# Patient Record
Sex: MALE | Race: WHITE | Employment: FULL TIME | URBAN - METROPOLITAN AREA
[De-identification: names, ages, dates, MRNs, and addresses within clinical notes are randomized per-mention and may not be internally consistent; named-entity substitution may affect disease eponyms.]

---

## 2022-04-11 ENCOUNTER — TELEPHONE (OUTPATIENT)
Dept: ORTHOPEDIC SURGERY | Age: 52
End: 2022-04-11

## 2022-04-11 ENCOUNTER — OFFICE VISIT (OUTPATIENT)
Dept: ORTHOPEDIC SURGERY | Age: 52
End: 2022-04-11
Payer: COMMERCIAL

## 2022-04-11 DIAGNOSIS — M25.562 LEFT KNEE PAIN, UNSPECIFIED CHRONICITY: Primary | ICD-10-CM

## 2022-04-11 PROCEDURE — 99203 OFFICE O/P NEW LOW 30 MIN: CPT | Performed by: PHYSICIAN ASSISTANT

## 2022-04-11 RX ORDER — CIPROFLOXACIN 500 MG/1
500 TABLET, FILM COATED ORAL EVERY 12 HOURS
COMMUNITY

## 2022-04-11 RX ORDER — DILTIAZEM HYDROCHLORIDE 120 MG/1
1 CAPSULE, EXTENDED RELEASE ORAL
COMMUNITY
Start: 2021-10-15

## 2022-04-11 RX ORDER — LISINOPRIL 10 MG/1
TABLET ORAL
COMMUNITY
Start: 2021-05-13

## 2022-04-11 RX ORDER — ATORVASTATIN CALCIUM 20 MG/1
TABLET, FILM COATED ORAL
COMMUNITY
Start: 2021-12-22

## 2022-04-11 NOTE — LETTER
[address] [facility]  [address] [facility] [address]  Phone: [phone]  Fax: [place], [place]        April 11, 2022     Patient: Moni Bal   YOB: 1970   Date of Visit: 4/11/2022       To Whom it May Concern:    Silver Llamas was seen in my clinic on 4/11/2022. 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 .    If you have any questions or concerns, please don't hesitate to call.     Sincerely,         CHRISTINA Xiao

## 2022-04-11 NOTE — LETTER
[address] [facility]  [address] [facility] [address]  Phone: [phone]  Fax: [place], [place]        April 11, 2022     Patient: Snow White   YOB: 1970   Date of Visit: 4/11/2022       To Whom it May Concern:    Ashish Merino was seen in my clinic on 4/11/2022. He may return to work on +4/13/22 with the following symptoms: LIMITED WALKING, STANDING AND LIMITED CLIMBING INTO TRUCK. .    If you have any questions or concerns, please don't hesitate to call.     Sincerely,         CHRISTINA Rosen

## 2022-04-11 NOTE — TELEPHONE ENCOUNTER
Called and spoke with patient. Let him know that he needs to get his MRI first. Patient asking if he can work. Explained that he will need to call his treating physician since we have not evaluated him. Patient stated that he will go to the [facility] to be seen.

## 2022-04-11 NOTE — PROGRESS NOTES
Subjective:      Patient ID: Marylen Floor is a 46 y.o. male who is here in the [address] [place], [place] [address]y [address]- for an initial evaluation of left medial and posterior knee pain. HPI:   He states back in early January he slipped on the ice and sustained a hyperflexion injury to the left knee. He developed moderate knee pain at that time. Pain has gradually improved over the past couple weeks but still having mild to moderate discomfort. He is concerned because he feels there is extra fluid on the left knee. 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 He has tried an elastic knee sleeve with mild improvement. He has also tried ice. He cannot take NSAIDs due to anticoagulation therapy. Approximately 2 weeks ago he was seen by Dr. Jluis Woodall with [facility]. He states he was not given much information at that appointment. However, he is scheduled for a MRI on his left knee 4/21/2022. Pain Scale initially was 4-5/10 VAS. Currently today 2/10 VAS. He was off work today. Location of pain medial and posterior left knee. Pain is worse with activities such as standing, walking, steps. Pain improves with ice. Review of Systems:  I have reviewed the clinically relevant past medical history, medications, allergies, family history, social history, and 13 point Review of Systems from the patient's recent history form & documented any details relevant to today's presenting complaints in the history above. The patient's self-reported past medical history, medications, allergies, family history, social history, and Review of Systems form from today's date have been scanned into the chart under the \"Media\" tab. As outlined in the HPI. Negative for fever or chills. Negative for poly-joint pain, swelling and stiffness. Negative for numbness or tingling into the affected extremity.      No past medical history on file. No family history on file. No past surgical history on file. Social History     Occupational History    Not on file   Tobacco Use    Smoking status: Never Smoker    Smokeless tobacco: Never Used   Substance and Sexual Activity    Alcohol use: Not on file    Drug use: Not on file    Sexual activity: Not on file       Current Outpatient Medications   Medication Sig Dispense Refill    apixaban (ELIQUIS) 5 MG TABS tablet Take by mouth      atorvastatin (LIPITOR) 20 MG tablet Take by mouth      lisinopril (PRINIVIL;ZESTRIL) 10 MG tablet Take by mouth      metFORMIN (GLUCOPHAGE) 500 MG tablet Take by mouth      dilTIAZem (TIAZAC) 120 MG extended release capsule Take 1 capsule by mouth      ciprofloxacin (CIPRO) 500 MG tablet Take 500 mg by mouth every 12 hours       No current facility-administered medications for this visit. No Known Allergies      Objective:     He is alert, oriented x 3, pleasant, well nourished, developed and in no acute distress. There were no vitals taken for this visit. Examination of the left knee: Inspection of the skin minimal swelling, no erythema. The overall alignment of the knee is neutral.  Gait is mildly antalgic. There is minimal intra-articular effusion. AROM:           PROM:  Extension-         0                   0  Flexion -           125                   130  There mild pain associated with ROM testing. Medial joint line is tender to palpation. Lateral joint line is not tender to palpation. Pes anserine bursa non-tender to palpation. Patellar tendon non-tender to palpation. Quadriceps tendon non- tender to palpation. Collateral ligaments non- tender to palpation. Popliteal fossa is tender to palpation. Varus Stress testing negative for laxity, pain or crepitus. Valgus Stress testing negative for laxity, pain or crepitus. Lachman's test negative for  ACL laxity.   Anterior Drawer test negative for ACL laxity. Posterior Drawer test negative for PCL laxity. Jessi's Test reproduces pain posteriorly. Patellar Compression testing negative for pain or crepitus. Extensor Mechanism is intact. Lower extremities:  He has 5/5 motor strength of bilateral lower extremities. He has a negative straight leg raise, bilaterally. Deep tendon reflexes at knees and achilles are 2+. Sensation is intact to light touch L3 to S1 bilaterally. He has no clonus. Examination of the lower extremities shows intact perfusion to both lower extremities. He has no cyanosis and digigts are warm to touch, capillary refill is less than 2 seconds. He has no edema noted. He has intact skin without lacerations or abrasions, no significant erythema, rashes or skin lesions. X Rays: were performed in the office today:   AP, PA Standing, Lateral and Sunrise views of left knee:   No acute fractures or dislocations noted. Knee x-rays negative for significant osteoarthritis. Additional Tests reviewed: none  Additional Outside Records reviewed: none    Diagnosis:       ICD-10-CM    1. Left knee pain, unspecified chronicity  M25.562 XR KNEE LEFT (MIN 4 VIEWS)        Assessment and Plan:       Assessment:  Hyperflexion injury to left knee which occurred several months ago. Mild joint swelling with a positive Jessi's test concerning for probable meniscus tear. He does have a MRI scheduled on his left knee 4/21/2022 at [facility]. This had been ordered by Dr. Emmy Clifton after being evaluated by him 2 weeks ago. Patient not satisfied with his care therefore he presents to the [address] [place] [address] after Hours clinic for reevaluation. I had an extensive discussion with  Javon Zane regarding the natural history, etiology, and long term consequences of his condition. I have presented reasonable alternatives to the patient's proposed care, treatment, and services.   Risks and benefits of the treatment options also reviewed in detail. I have outlined a treatment plan with them. He has had full opportunity to ask his questions. I have answered them all to his satisfaction. I feel that Mr. Elton Pinon understands our discussion today       Plan:  Medications-Tylenol recommended for pain. No NSAIDs due to anticoagulation therapy. PT- A home exercise program was instructed today including ROM exercises and strengthening exercises. The patient verbalized understanding of these exercises as well as the importance of the exercise program to promote return of normal function. If pain intensifies or other problems arise you are to notify the office. Further Imaging-proceed with MRI left knee as previously scheduled. Follow up-Dr. Wing Henning after his MRI appointment. Call or return to clinic if these symptoms worsen or fail to improve as anticipated. Ambreen Herrera PA-C   Senior Physician Assistant   [facility]                                         Disclaimer: This note was generated with use of a verbal recognition program (DRAGON) and an attempt was made to check for errors. It is possible that there are still dictated errors within this office note. If so, please bring any significant errors to my attention for an addendum. All efforts were made to ensure that this office note is accurate.

## 2022-04-25 ENCOUNTER — OFFICE VISIT (OUTPATIENT)
Dept: ORTHOPEDIC SURGERY | Age: 52
End: 2022-04-25
Payer: COMMERCIAL

## 2022-04-25 VITALS — HEIGHT: 71 IN | BODY MASS INDEX: 40.6 KG/M2 | WEIGHT: 290 LBS

## 2022-04-25 DIAGNOSIS — S83.232A COMPLEX TEAR OF MEDIAL MENISCUS OF LEFT KNEE AS CURRENT INJURY, INITIAL ENCOUNTER: ICD-10-CM

## 2022-04-25 DIAGNOSIS — M25.562 ACUTE PAIN OF LEFT KNEE: ICD-10-CM

## 2022-04-25 DIAGNOSIS — M17.12 LOCALIZED OSTEOARTHRITIS OF LEFT KNEE: ICD-10-CM

## 2022-04-25 DIAGNOSIS — M84.453A INSUFFICIENCY FRACTURE OF MEDIAL FEMORAL CONDYLE (HCC): Primary | ICD-10-CM

## 2022-04-25 PROCEDURE — 99204 OFFICE O/P NEW MOD 45 MIN: CPT | Performed by: ORTHOPAEDIC SURGERY

## 2022-04-25 NOTE — PROGRESS NOTES
ORTHOPAEDIC SURGERY H&P / CONSULTATION NOTE    Chief complaint:   Chief Complaint   Patient presents with    Knee Pain     left knee pain        History of present illness: The patient is a 46 y.o. male with subjective symptoms of left knee pain. The chief complaint is located at medial aspect of the left knee. Duration of symptoms has been for 3 months. The severity of symptoms is rated at 3/10 pain on intake form. Patient states that he had slipped and fallen and ultimately had his left knee \"fold back on him\". He had seen Dr. Clarke Chris initially and had obtained radiographs and MRI. For reasons that he states he wished to obtain a second opinion, he follows up today with MRI and wishing to discuss potential treatment options. He states dull throbbing aching pain in the medial aspect of the left knee but this can also be with sharp pain along the anteromedial aspect of the knee. He has been taking Tylenol as he is unable to take oral anti-inflammatories given he is on blood thinners for atrial fibrillation. He has not done any formal physical therapy and has not had any activity modifications of significance. He states he walks around with a limp and uses a neoprene knee sleeve. He is wearing this today. He denies significant swelling in the knee. He states he does have some occasional mechanical twisting knee pain    The patient has tried the below listed items prior to today's consultation for above listed chief complaint.     -   Over-the-counter anti-inflammatories/prescription medication anti-inflammatory. -   Physical therapy / guided home exercise program -     -   Previous corticosteroid injections    Past medical history:  No past medical history on file. Past surgical history:  No past surgical history on file.      Allergies:  No Known Allergies      Medications:   Current Outpatient Medications:     apixaban (ELIQUIS) 5 MG TABS tablet, Take by mouth, Disp: , Rfl:     atorvastatin (LIPITOR) 20 MG tablet, Take by mouth, Disp: , Rfl:     lisinopril (PRINIVIL;ZESTRIL) 10 MG tablet, Take by mouth, Disp: , Rfl:     metFORMIN (GLUCOPHAGE) 500 MG tablet, Take by mouth, Disp: , Rfl:     dilTIAZem (TIAZAC) 120 MG extended release capsule, Take 1 capsule by mouth, Disp: , Rfl:     ciprofloxacin (CIPRO) 500 MG tablet, Take 500 mg by mouth every 12 hours, Disp: , Rfl:      Social history: Denies IV drug use. Social History     Socioeconomic History    Marital status: Single     Spouse name: Not on file    Number of children: Not on file    Years of education: Not on file    Highest education level: Not on file   Occupational History    Not on file   Tobacco Use    Smoking status: Never Smoker    Smokeless tobacco: Never Used   Substance and Sexual Activity    Alcohol use: Not on file    Drug use: Not on file    Sexual activity: Not on file   Other Topics Concern    Not on file   Social History Narrative    Not on file     Social Determinants of Health     Financial Resource Strain:     Difficulty of Paying Living Expenses: Not on file   Food Insecurity:     Worried About Running Out of Food in the Last Year: Not on file    Rigo of Food in the Last Year: Not on file   Transportation Needs:     Lack of Transportation (Medical): Not on file    Lack of Transportation (Non-Medical):  Not on file   Physical Activity:     Days of Exercise per Week: Not on file    Minutes of Exercise per Session: Not on file   Stress:     Feeling of Stress : Not on file   Social Connections:     Frequency of Communication with Friends and Family: Not on file    Frequency of Social Gatherings with Friends and Family: Not on file    Attends Scientology Services: Not on file    Active Member of Clubs or Organizations: Not on file    Attends Club or Organization Meetings: Not on file    Marital Status: Not on file   Intimate Partner Violence:     Fear of Current or Ex-Partner: Not on file   [address] [place] [address] Emotionally Abused: Not on file    Physically Abused: Not on file    Sexually Abused: Not on file   Housing Stability:     Unable to Pay for Housing in the Last Year: Not on file    Number of Places Lived in the Last Year: Not on file    Unstable Housing in the Last Year: Not on file     Tobacco use. Social History     Tobacco Use   Smoking Status Never Smoker   Smokeless Tobacco Never Used     Employment: Noncontributory    Workers compensation claim: Noncontributory    Review of systems: Patient denies any fevers chills chest pain shortness of breath nausea vomiting significant weight loss any change in voiding or bowel movements. Patient denies any significant numbness or tingling at baseline as it relates to this presenting symptom/chief complaint. The patient denies any significant problems with skin or any significant allergies. Physical examination:  Body mass index is 40.45 kg/m². AAOx3, NCAT  EOMI  MMM  RR  Unlabored breathing, no wheezing  Skin intact BUE and BLE, warm and moist  Bilateral lower extremity examination specific to subjective symptoms  Exam Left Lower Extremity  Trace effusion,      0/118/0 active ROM (E/F/Lag), same P assive ROM (E/F/Lag), negative anterior Drawer, 1A Lachman, negative posterior Drawer,  Stable varus/valgus at 0 and 30?,    none TTP Joint Line, trace posterior medial Jessi,   positive pain upon tenderness to palpation medial femoral condyle with the knee at 90 degrees. Skin intact throughout  5/5 IP Q H TA G EHL  SILT DP SP LP MP S S  +2 DP pulse    Diagnostic imaging:  MY READ:  4 view left knee 4/11/2022: Negative fracture. Positive medial and patellofemoral joint space narrowing greater than lateral  MRI left knee 4/21/2022: See outside hospital report  MY READ: ACL PCL LCL MCL intact. No gross lateral meniscus tear.   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 This is in the setting of baseline chondromalacia however this edema is more so appreciated either by way of the osteochondral subcortical insufficiency fracture versus a direct impact.  -To that end, I would like to provide 6 weeks or so of protected weightbearing status. He was given crutches today and may 25% partial weightbearing. He can work on full knee range of motion and I instructed him not to place any pillows behind the knee. -Formal physical therapy has been prescribed to work on LEIF knee reconditioning and strengthening to include core and hip reconditioning.  -He is unable to take oral anti-inflammatories so we will recommend Voltaren topical gel per bottle as needed discomfort and OTC Tylenol per bottle parent discomfort  -Given the insufficiency fracture/injury, I have also ordered a vitamin D panel for review. Should he be insufficient/deficient then adequate therapy will be started and he will be referred to endocrinology at that time for longer-term duration and treatment and work-up. -Plan will be to have him follow-up in 6 to 8 weeks time where progressive increase her weightbearing status and weaning off crutches will be performed. I reviewed with him that should he have significant mechanical twisting knee pain in the future and persistent, always consideration at that time for a partial meniscectomy however that does not seem his primary pain generator at this time. He expressed understanding.  -All questions answered to the patient's satisfaction and the patient expressed understanding and agreement with the above listed treatment plan  -Follow up in 6 to 8 weeks time with AP and Lawayne Chi view only as 2 view left knee and for repeat clinical examination and progression with range of motion  -Thank you for the clinical consultation and allowing me to participate in the patient's care.       Electronically signed by Crissy Ortega MD on 4/25/22 at 12:09 PM EDT Dylan Love MD       Orthopaedic Surgery-Sports Medicine        Disclaimer: This note was dictated with voice recognition software. Though review and correction are routinely performed, please contact the office/medical records for any errors requiring correction.

## 2022-04-25 NOTE — LETTER
[address] [place] [address]  Phone: [phone]  Fax: [phone]    Sudhakar Cordova MD        April 25, 2022     Patient: Lubna Tavera   YOB: 1970   Date of Visit: 4/25/2022       To Whom It May Concern: It is my medical opinion that Hardik Bentley may return to light duty immediately with the following restrictions: on crutches for the next 6 weeks. If you have any questions or concerns, please don't hesitate to call.     Sincerely,           Sudhakar Cordova MD       Orthopaedic Surgery-Sports Medicine      Sudhakar Cordova MD

## 2022-05-06 ENCOUNTER — TELEPHONE (OUTPATIENT)
Dept: ORTHOPEDIC SURGERY | Age: 52
End: 2022-05-06

## 2022-05-06 NOTE — TELEPHONE ENCOUNTER
General Question     Subject: PT CALLED CONCERNING HIS SHORT TERM DISABILITY PAPERWORK.   Amador Calero DISABILITY REQUESTED CONFIRMATION REPORT OF KNEE.  [facility]. [phone]  CLAIM  [id number]  Patient and /or Facility Request: Jesus Aleman Number: [phone]

## 2022-05-09 ENCOUNTER — TELEPHONE (OUTPATIENT)
Dept: ORTHOPEDIC SURGERY | Age: 52
End: 2022-05-09

## 2022-05-09 NOTE — TELEPHONE ENCOUNTER
Called patient back and told him to find out what is needed from his employer for the paper work and then he can fax it over to me.

## 2022-05-17 ENCOUNTER — TELEPHONE (OUTPATIENT)
Dept: ORTHOPEDIC SURGERY | Age: 52
End: 2022-05-17

## 2022-05-17 NOTE — TELEPHONE ENCOUNTER
Called patient back and let him know that I will fax over clinical notes and email them to him as well.
General Question     Subject: SHORT TERM DISABILITY PAPERWORK  Patient and /or Facility Request: Warren Marquez  Contact Number: [phone]    PATIENT IS CALLING REGARDING SHORT TERM DISABILITY PAPERWORK. PATIENT IS STATING OFFICE NOTES ARE NEEDING TO BE SENT OVER TO [facility]. PATIENT IS ALSO REQ A COPY OF OFFICE NOTES AS WELL. PLEASE CALL BACK AT ABOVE NUMBER.
0.2

## 2022-05-23 ENCOUNTER — OFFICE VISIT (OUTPATIENT)
Dept: ORTHOPEDIC SURGERY | Age: 52
End: 2022-05-23

## 2022-05-23 DIAGNOSIS — G89.29 CHRONIC PAIN OF LEFT KNEE: ICD-10-CM

## 2022-05-23 DIAGNOSIS — M84.453A INSUFFICIENCY FRACTURE OF MEDIAL FEMORAL CONDYLE (HCC): Primary | ICD-10-CM

## 2022-05-23 DIAGNOSIS — M25.562 CHRONIC PAIN OF LEFT KNEE: ICD-10-CM

## 2022-05-23 PROCEDURE — 99213 OFFICE O/P EST LOW 20 MIN: CPT | Performed by: ORTHOPAEDIC SURGERY

## 2022-05-23 NOTE — PROGRESS NOTES
FOLLOW UP ORTHOPAEDIC NOTE    The patient follows up today for reevaluation of left knee pain. The patient states he has not been doing formal physical therapy as was previously prescribed. He did not obtain the vitamin D blood work as previously ordered. He has transition to using just a single crutch with weightbearing. He has been utilizing Voltaren topical gel OTC as needed. He had contacted the office regarding short-term disability paperwork as documented in the chart. He states still having some discomfort and pain with walking, although he does have improved symptoms compared to where he was. 2/10 pain on intake form. PE:  AAOx3  RR  Unlabored breathing  Skin warm and moist  Focused physical examination of the left knee  Minimal if any tenderness to the knee medial joint line with the knee at 90 degrees flexion. Skin intact throughout  5/5 IP Q H TA G EHL  SILT DP SP LP MP S S  +2 DP pulse    Pertinent radiographs/imaging:  3 view left knee 5/23/2022: Negative fracture. No gross interval change with regard to the medial joint line-femoral condyle/medial tibial plateau     Diagnosis Orders   1. Insufficiency fracture of medial femoral condyle (HCC)  XR KNEE LEFT (1-2 VIEWS)   2. Chronic pain of left knee       Assessment and plan: 46 male with continued subjective symptoms of left knee pain with known, correlating diagnosis of left knee insufficiency fracture of the medial femoral condyle, also with finding of medial meniscus tear.  -Time of 16 minutes was spent coordinating and discussing the clinical findings and diagnostic imaging results as they pertain to the patient's presenting subjective symptoms.  -I had a pleasant discussion with the patient today. I did review with him that I was surprised that he did not follow through with our previous treatment plan as discussed.   I advocated that he once again go get the vitamin D level so that we can see if he is vitamin D insufficient or deficient that might require additional medical comanagement and replacement therapy. He states that he will do so  -I also reviewed with him the importance of protected weightbearing which was 25% and to crutch use for roughly 4 to 6 weeks. He was also supposed to follow-up thereafter after having completed that. We will go ahead and resume 25% partial weightbearing using 2 crutches at this time. He states he will do so.  -He also did not start his formal physical therapy. I advocated he do this as a referral is still in place. He states that he will do so. -I do feel strongly that we will be able to continue to improve his overall baseline function and range of motion and symptomatology. He denies mechanical twisting knee pain at this time so I still feel that this is more so like an overload related phenomenon, especially with his BMI over 40.  -I will follow up with him once he obtains blood work with additional treatment options as indicated at that time.  -All questions answered to the patient's satisfaction and the patient expressed understanding and agreement with the above listed treatment plan  -Follow up in 6 weeks time for repeat evaluation clinical examination and AP and PA weightbearing bent view of the left knee. -Thank you for the clinical consultation and allowing me to participate in the patient's care. Electronically signed by Chuck Jefferson MD on 5/23/22 at 12:21 PM PAPO Jefferson MD       Orthopaedic Surgery-Sports Medicine    Disclaimer: This note was dictated with voice recognition software. Though review and correction are routinely performed, please contact the office/medical records for any errors requiring correction.

## 2022-05-24 ENCOUNTER — HOSPITAL ENCOUNTER (OUTPATIENT)
Dept: PHYSICAL THERAPY | Age: 52
Setting detail: THERAPIES SERIES
Discharge: HOME | End: 2022-05-24
Payer: COMMERCIAL

## 2022-05-24 NOTE — FLOWSHEET NOTE
[place] [address] [facility],  [address]        Physical Therapy  Cancellation/No-show Note  Patient Name:  Jannette Schroeder  :  1970   Date:  2022  Cancelled visits to date: 1 initial eval  No-shows to date: 0    For today's appointment patient:  [x]  Cancelled  []  Rescheduled appointment  []  No-show     Reason given by patient:  []  Patient ill  []  Conflicting appointment  []  No transportation    []  Conflict with work  []  No reason given  [x]  Other: R/S for next day eval   Comments:      Electronically signed by:  Spencer Day, PT, DPT

## 2022-05-25 ENCOUNTER — HOSPITAL ENCOUNTER (OUTPATIENT)
Dept: PHYSICAL THERAPY | Age: 52
Setting detail: THERAPIES SERIES
Discharge: HOME | End: 2022-05-25
Payer: COMMERCIAL

## 2022-05-25 ENCOUNTER — TELEPHONE (OUTPATIENT)
Dept: ORTHOPEDIC SURGERY | Age: 52
End: 2022-05-25

## 2022-05-25 PROCEDURE — 97110 THERAPEUTIC EXERCISES: CPT | Performed by: PHYSICAL THERAPIST

## 2022-05-25 PROCEDURE — 97161 PT EVAL LOW COMPLEX 20 MIN: CPT | Performed by: PHYSICAL THERAPIST

## 2022-05-25 NOTE — PLAN OF CARE
[facility], [address], [address] [place] [address]  Phone: [phone]  Fax [phone]     Hilda Boone    Dear  Dr. Tammy Foster ,    We had the pleasure of evaluating the following patient for physical therapy services at [address] [place] [address]. A summary of our findings can be found in the initial assessment below. This includes our plan of care. If you have any questions or concerns regarding these findings, please do not hesitate to contact me at the office phone number checked above.   Thank you for the referral.       Physician Signature:_______________________________Date:__________________  By signing above (or electronic signature), therapists plan is approved by physician    Patient: Kat Soto   : 1970   MRN: [id number]  Referring Physician:  Dr. Dupont Billie       Evaluation Date: 2022      Medical Diagnosis Information:  Diagnosis: M17.12 (ICD-10-CM) - Localized osteoarthritis of left [id number] (ICD-10-CM) - Complex tear of medial meniscus of left knee as current injury, initial geexcrcgfU64.453A (ICD-10-CM) - Insufficiency fracture of medial femoral condyle (HCC)   Treatment Diagnosis: R knee pain M25.561                                         Insurance information: PT Insurance Information: [email or web address]       Precautions/ Contra-indications: suppose to be 25% WBing, A-fib, DM II, mild htn      C-SSRS Triggered by Intake questionnaire (Past 2 wk assessment):   [x] No, Questionnaire did not trigger screening.   [] Yes, Patient intake triggered further evaluation      [] C-SSRS Screening completed  [] PCP notified via Plan of Care  [] Emergency services notified     Latex Allergy:  [x]NO      []YES  Preferred Language for Healthcare:   [x]English       []other:    SUBJECTIVE: Patient reports in 2022, he was walking out to his jeep in a steep driveway and slipped on ice; and his L leg folded underneath him and he fell on his back. He had pain for 3 months and was wearing a compression sleeve. Pt reports he finally went to see MD but had not prev because he was so busy at work. He had xray and MRI which showed torn mensicus and insufficency fx. He was told to attend PT and 25% Wbing. He is on short term disability and there is no light duty.  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 [address] [place]; pt was working as , was able to ride motorcycle     OBJECTIVE:     ROM LEFT RIGHT   HIP Flex     HIP Abd     HIP Ext     HIP IR     HIP ER     Knee ext -5 -3   Knee Flex 121 130   Ankle PF     Ankle DF     Ankle In     Ankle Ev     Strength  LEFT RIGHT   HIP Flexors 5 5   HIP Abductors 4- 4   HIP Ext     Hip ER     Knee EXT (quad) 4 4+   Knee Flex (HS) 5 5   Ankle DF 5 5   Ankle PF     Ankle Inv     Ankle EV          Circumference  Mid apex  7 cm prox             Reflexes/Sensation: NT    []Dermatomes/Myotomes intact    [x]Reflexes equal and normal bilaterally   []Other:    Joint mobility:    [x]Normal    []Hypo   []Hyper    Palpation: mild tenderness with palp to R med meniscus     Functional Mobility/Transfers: independent     Posture: fwd flexed     Bandages/Dressings/Incisions: NA     Gait: 25% wbing with 2 crutches, max VC's for sequencing nd WBing, decreased speed, difficulty understanding 25% WBing     Orthopedic Special Tests: NT                        [x] Patient history, allergies, meds reviewed. Medical chart reviewed. See intake form. Review Of Systems (ROS):  [x]Performed Review of systems (Integumentary, CardioPulmonary, Neurological) by intake and observation. Intake form has been scanned into medical record. Patient has been instructed to contact their primary care physician regarding ROS issues if not already being addressed at this time. Co-morbidities/Complexities (which will affect course of rehabilitation):   []None           Arthritic conditions   []Rheumatoid arthritis (M05.9)  []Osteoarthritis (M19.91)   Cardiovascular conditions   []Hypertension (I10)  []Hyperlipidemia (E78.5)  []Angina pectoris (I20)  []Atherosclerosis (I70)   Musculoskeletal conditions   []Disc pathology   []Congenital spine pathologies   []Prior surgical intervention  []Osteoporosis (M81.8)  []Osteopenia (M85.8)   Endocrine conditions   []Hypothyroid (E03.9)  []Hyperthyroid Gastrointestinal conditions   []Constipation (G63.56)   Metabolic conditions   [x]Morbid obesity (E66.01)  [x]Diabetes type 1(E10.65) or 2 (E11.65)   []Neuropathy (G60.9)     Pulmonary conditions   []Asthma (J45)  []Coughing   []COPD (J44.9)   Psychological Disorders  []Anxiety (F41.9)  []Depression (F32.9)   []Other:   [x]Other: A-fib         Barriers to/and or personal factors that will affect rehab potential:              []Age  []Sex              []Motivation/Lack of Motivation                        []Co-Morbidities              []Cognitive Function, education/learning barriers              []Environmental, home barriers              []profession/work barriers  []past PT/medical experience  []other:  Justification:     Falls Risk Assessment (30 days):   [x] Falls Risk assessed and no intervention required.   [] Falls Risk assessed and Patient requires intervention due to being higher risk   TUG score (>12s at risk): [] Falls education provided, including           ASSESSMENT:   Functional Impairments:     []Noted lumbar/proximal hip/LE joint hypomobility   [x]Decreased LE functional ROM   [x]Decreased core/proximal hip strength and neuromuscular control   [x]Decreased LE functional strength   [x]Reduced balance/proprioceptive control   []other:      Functional Activity Limitations (from functional questionnaire and intake)   []Reduced ability to tolerate prolonged functional positions   [x]Reduced ability or difficulty with changes of positions or transfers between positions   [x]Reduced ability to maintain good posture and demonstrate good body mechanics with sitting, bending, and lifting   []Reduced ability to sleep   [] Reduced ability or tolerance with driving and/or computer work   [x]Reduced ability to perform lifting, carrying tasks   [x]Reduced ability to squat   []Reduced ability to forward bend   [x]Reduced ability to ambulate prolonged functional periods/distances/surfaces   [x]Reduced ability to ascend/descend stairs   []Reduced ability to run, hop, cut or jump   []other:    Participation Restrictions   [x]Reduced participation in self care activities   [x]Reduced participation in home management activities   [x]Reduced participation in work activities   [x]Reduced participation in social activities. [x]Reduced participation in sport/recreation activities. Classification :    []Signs/symptoms consistent with post-surgical status including decreased ROM, strength and function.    []Signs/symptoms consistent with joint sprain/strain   []Signs/symptoms consistent with patella-femoral syndrome   []Signs/symptoms consistent with knee OA/hip OA   [x]Signs/symptoms consistent with internal derangement of knee/Hip   [x]Signs/symptoms consistent with functional hip weakness/NMR control      []Signs/symptoms consistent with tendinitis/tendinosis    []signs/symptoms consistent with pathology which may benefit from Dry needling      []other:      Prognosis/Rehab Potential:      []Excellent   [x]Good    []Fair   []Poor    Tolerance of evaluation/treatment:    []Excellent   [x]Good    []Fair   []Poor  Physical Therapy Evaluation Complexity Justification  [x] A history of present problem with:  [] no personal factors and/or comorbidities that impact the plan of care;  []1-2 personal factors and/or comorbidities that impact the plan of care  [x]3 personal factors and/or comorbidities that impact the plan of care  [x] An examination of body systems using standardized tests and measures addressing any of the following: body structures and functions (impairments), activity limitations, and/or participation restrictions;:  [x] a total of 1-2 or more elements   [] a total of 3 or more elements   [] a total of 4 or more elements   [x] A clinical presentation with:  [x] stable and/or uncomplicated characteristics   [] evolving clinical presentation with changing characteristics  [] unstable and unpredictable characteristics;   [x] Clinical decision making of [x] low, [] moderate, [] high complexity using standardized patient assessment instrument and/or measurable assessment of functional outcome. [x] EVAL (LOW) 84264 (typically 20 minutes face-to-face)  [] EVAL (MOD) 57433 (typically 30 minutes face-to-face)  [] EVAL (HIGH) 33511 (typically 45 minutes face-to-face)  [] RE-EVAL       PLAN:   Frequency/Duration:  2 days per week for 10 Weeks:  Interventions:  [x]  Therapeutic exercise including: strength training, ROM, for Lower extremity and core   [x]  NMR activation and proprioception for LE, Glutes and Core   [x]  Manual therapy as indicated for LE, Hip and spine to include: Dry Needling/IASTM, STM, PROM, Gr I-IV mobilizations, manipulation.    [x] Modalities as needed that may include: thermal agents, E-stim, Biofeedback, US, iontophoresis as indicated  [x] Patient education on joint protection, postural re-education, activity modification, progression of HEP. HEP instruction: see flowsheet     GOALS:  Patient stated goal: be able to walk 1-2 miles  [] Progressing: [] Met: [] Not Met: [] Adjusted    Therapist goals for Patient:   Short Term Goals: To be achieved in: 2 weeks  1. Independent in HEP and progression per patient tolerance, in order to prevent re-injury. [] Progressing: [] Met: [] Not Met: [] Adjusted  2. Patient will have a decrease in pain to facilitate improvement in movement, function, and ADLs as indicated by Functional Deficits. [] Progressing: [] Met: [] Not Met: [] Adjusted    Long Term Goals: To be achieved in: 10 weeks  1. Disability index score of 72% or more per FOTO to assist with reaching prior level of function. [] Progressing: [] Met: [] Not Met: [] Adjusted  2. Patient will demonstrate increased AROM to L knee flex to 130 and ext to -3 to allow for proper joint functioning as indicated by patients Functional Deficits. [] Progressing: [] Met: [] Not Met: [] Adjusted  3. Patient will demonstrate an increase in Strength to good proximal hip strength and control, within 5lb HHD in LE to allow for proper functional mobility as indicated by patients Functional Deficits. [] Progressing: [] Met: [] Not Met: [] Adjusted  4. Patient will return to being able to ambulate without an AD with proper gait, ascend and descend stairs with reciprocal gait in home  without increased symptoms or restriction. [] Progressing: [] Met: [] Not Met: [] Adjusted  5.  Patient able to return to work as a     [] Progressing: [] Met: [] Not Met: [] Adjusted     Electronically signed by:  Mello Castellano PT

## 2022-05-25 NOTE — TELEPHONE ENCOUNTER
General Question     Subject: PATIENT IS NEEDING THE APPT NOTES FROM Monday 05/23/22 FAXED TO    FAX: [phone] (ATTN: Boyd Bernal)  CASE #: [id number]    Patient: Ade Upton  Contact Number: [phone]

## 2022-05-25 NOTE — TELEPHONE ENCOUNTER
General Question     Subject: PATIENT REQUESTING THIS ALSO BE SENT TO HIS EMAIL. Patient Rhianna   Contact Number: [phone]     [email or web address]

## 2022-05-25 NOTE — FLOWSHEET NOTE
[facility],  [address] [place]  Phone: [phone]  Fax [phone]    Physical Therapy Treatment Note/ Progress Report:           Date:  2022    Patient Name:  Agata Hampton    :  1970  MRN: [id number]  Restrictions/Precautions:    Medical/Treatment Diagnosis Information:  Diagnosis: M17.12 (ICD-10-CM) - Localized osteoarthritis of left [id number] (ICD-10-CM) - Complex tear of medial meniscus of left knee as current injury, initial ckokszbteE67.453A (ICD-10-CM) - Insufficiency fracture of medial femoral condyle (HCC)  Treatment Diagnosis: R knee pain K42.507  Insurance/Certification information:  PT Insurance Information: [email or web address]  Physician Information:   Dr. Jeanne Paul   Has the plan of care been signed (Y/N):        []  Yes  [x]  No     Date of Patient follow up with Physician: 22      Is this a Progress Report:     []  Yes  [x]  No        If Yes:  Date Range for reporting period:  Beginning 22  Ending     Progress report will be due (10 Rx or 30 days whichever is less):        Recertification will be due (POC Duration  / 90 days whichever is less): 8/3/22      Visit # Insurance Allowable Auth Required   In-person 1  []  Yes []  No    Telehealth   []  Yes []  No    Total          Functional Scale: FOTO 67/100   Date assessed:  22      Therapy Diagnosis/Practice Pattern: 4E      Number of Comorbidities:  []0     []1-2    [x]3+    Latex Allergy:  [x]NO      []YES  Preferred Language for Healthcare:   [x]English       []other:      Pain level:  4/10 R knee     SUBJECTIVE:  See eval    OBJECTIVE: See eval   Observation:    Test measurements:      RESTRICTIONS/PRECAUTIONS: A-fib, HTN, DM II ; 25% WBing until next MD appt     Exercises/Interventions:     Therapeutic Ex (95230) Sets/sec Reps Notes/CUES   Sitting HS stretch  30\"x3   Hep    Sitting gastroc stretch  30\"x3   Hep    Sitting QS with towel roll  10\"x10  Hep    Supine QS with SLR  2\"x10   Hep    edu diagnosis, gait training with 2 crutches  5 min                                                Manual Intervention (89793)                                          NMR re-education (16281)   CUES NEEDED                                                         Therapeutic Activity (05199)                                                Therapeutic Exercise and NMR EXR  [] (35282) Provided verbal/tactile cueing for activities related to strengthening, flexibility, endurance, ROM for improvements in LE, proximal hip, and core control with self care, mobility, lifting, ambulation.  [] (19382) Provided verbal/tactile cueing for activities related to improving balance, coordination, kinesthetic sense, posture, motor skill, proprioception  to assist with LE, proximal hip, and core control in self care, mobility, lifting, ambulation and eccentric single leg control.      NMR and Therapeutic Activities:    [] (89612 or 66107) Provided verbal/tactile cueing for activities related to improving balance, coordination, kinesthetic sense, posture, motor skill, proprioception and motor activation to allow for proper function of core, proximal hip and LE with self care and ADLs  [] (94593) Gait Re-education- Provided training and instruction to the patient for proper LE, core and proximal hip recruitment and positioning and eccentric body weight control with ambulation re-education including up and down stairs     Home Exercise Program:    [x] (49844) Reviewed/Progressed HEP activities related to strengthening, flexibility, endurance, ROM of core, proximal hip and LE for functional self-care, mobility, lifting and ambulation/stair navigation   [] (48827)Reviewed/Progressed HEP activities related to improving balance, coordination, kinesthetic sense, posture, motor skill, proprioception of core, proximal hip and LE for self care, mobility, lifting, and ambulation/stair navigation      Manual Treatments:  PROM / STM / Oscillations-Mobs:  G-I, II, III, IV (PA's, Inf., Post.)  [] (37673) Provided manual therapy to mobilize LE, proximal hip and/or LS spine soft tissue/joints for the purpose of modulating pain, promoting relaxation,  increasing ROM, reducing/eliminating soft tissue swelling/inflammation/restriction, improving soft tissue extensibility and allowing for proper ROM for normal function with self care, mobility, lifting and ambulation. Modalities:  CP to L knee for 15 min    [] GAME READY (VASO)- for significant edema, swelling, pain control. Charges  Timed Code Treatment Minutes: 20    Total Treatment Minutes: 60      Medicare total (add KX at $2000)         BWC time in/ time out:    TE time in /out    Manual time in/out    Neuro re ed time in/out    Untimed minutes        [x] EVAL (LOW) 25558   [] EVAL (MOD) 47556  [] EVAL (HIGH) 08996   [] RE-EVAL     [x] (49708) x1     [] IONTO  [] NMR (02868) x     [] VASO  [] Manual (68756) x      [] Other:  [] TA x      [] Mech Traction (50056)  [] ES(attended) (47865)      [] ES (un) (12653):       GOALS:  Patient stated goal: be able to walk 1-2 miles  [] Progressing: [] Met: [] Not Met: [] Adjusted    Therapist goals for Patient:   Short Term Goals: To be achieved in: 2 weeks  1. Independent in HEP and progression per patient tolerance, in order to prevent re-injury. [] Progressing: [] Met: [] Not Met: [] Adjusted  2. Patient will have a decrease in pain to facilitate improvement in movement, function, and ADLs as indicated by Functional Deficits. [] Progressing: [] Met: [] Not Met: [] Adjusted    Long Term Goals: To be achieved in: 10 weeks  1. Disability index score of 72% or more per FOTO to assist with reaching prior level of function. [] Progressing: [] Met: [] Not Met: [] Adjusted  2.  Patient will demonstrate increased AROM to L knee flex to 130 and ext to -3 to allow for proper joint functioning as indicated by patients Functional Deficits. [] Progressing: [] Met: [] Not Met: [] Adjusted  3. Patient will demonstrate an increase in Strength to good proximal hip strength and control, within 5lb HHD in LE to allow for proper functional mobility as indicated by patients Functional Deficits. [] Progressing: [] Met: [] Not Met: [] Adjusted  4. Patient will return to being able to ambulate without an AD with proper gait, ascend and descend stairs with reciprocal gait in home  without increased symptoms or restriction. [] Progressing: [] Met: [] Not Met: [] Adjusted  5. Patient able to return to work as a     [] Progressing: [] Met: [] Not Met: [] Adjusted    Progression Towards Functional goals:  [] Patient is progressing as expected towards functional goals listed. [] Progression is slowed due to complexities listed. [] Progression has been slowed due to co-morbidities. [x] Plan just implemented, too soon to assess goals progression  [] Other:         Overall Progression Towards Functional goals/ Treatment Progress Update:  [] Patient is progressing as expected towards functional goals listed. [] Progression is slowed due to complexities/Impairments listed. [] Progression has been slowed due to co-morbidities.   [x] Plan just implemented, too soon to assess goals progression <30days   [] Goals require adjustment due to lack of progress  [] Patient is not progressing as expected and requires additional follow up with physician  [] Other    Prognosis for POC: [x] Good [] Fair  [] Poor      Patient requires continued skilled intervention: [x] Yes  [] No    Treatment/Activity Tolerance:  [x] Patient able to complete treatment  [] Patient limited by fatigue  [] Patient limited by pain    [] Patient limited by other medical complications  [] Other:     ASSESSMENT: see eval     Return to Play: (if applicable)   []  Stage 1: Intro to Strength   []  Stage 2: Return to Run and Strength   []  Stage 3: Return to Jump and Strength   []  Stage 4: Dynamic Strength and Agility   []  Stage 5: Sport Specific Training     []  Ready to Return to Play, Meets All Above Stages   []  Not Ready for Return to Sports   Comments:                         Access Code: [facility]  URL: [email or web address]  Date: 05/25/2022  Prepared by: Tierney Goode    Exercises  Seated Table Hamstring Stretch - 2 x daily - 7 x weekly - 1 sets - 3 reps - 30 hold  Long Sitting Calf Stretch with Strap - 2 x daily - 7 x weekly - 3 sets - 3 reps - 30 hold  Long Sitting Quad Set - 2 x daily - 7 x weekly - 1 sets - 10 reps - 10 hold  Supine Active Straight Leg Raise - 2 x daily - 7 x weekly - 2 sets - 10 reps - 2 hold        PLAN: See eval  [] Continue per plan of care [] Alter current plan (see comments above)  [x] Plan of care initiated [] Hold pending MD visit [] Discharge      Electronically signed by:  Tierney Goode [id number]     Note: If patient does not return for scheduled/ recommended follow up visits, this note will serve as a discharge from care along with most recent update on progress.

## 2022-05-31 ENCOUNTER — HOSPITAL ENCOUNTER (OUTPATIENT)
Dept: PHYSICAL THERAPY | Age: 52
Setting detail: THERAPIES SERIES
Discharge: HOME | End: 2022-05-31
Payer: COMMERCIAL

## 2022-05-31 PROCEDURE — 97112 NEUROMUSCULAR REEDUCATION: CPT | Performed by: PHYSICAL THERAPIST

## 2022-05-31 PROCEDURE — 97016 VASOPNEUMATIC DEVICE THERAPY: CPT | Performed by: PHYSICAL THERAPIST

## 2022-05-31 PROCEDURE — 97110 THERAPEUTIC EXERCISES: CPT | Performed by: PHYSICAL THERAPIST

## 2022-05-31 NOTE — FLOWSHEET NOTE
RESTRICTIONS/PRECAUTIONS: A-fib, HTN, DM II ; 25% WBing until next MD appt     Exercises/Interventions:     Therapeutic Ex (21381)/ NMR re-education (82899) Sets/sec Notes/CUES   Sitting HS stretch  30\"x3  Hep    Sitting gastroc stretch  30\"x3  Hep    Sitting QS with towel roll  10\"x10 Hep    Supine QS with SLR  2\"2 x10 ea leg Hep    Hooklying hip abd with band Green versaloop 3 sec 2x10    SAQ with add squeeze 3 sec hold 0# 2x10    LAQ 0# 2x10    Standing ham curls (all weight on right leg) 0# 1x10    Supine bridge with both legs on ball (knees straight) 3 sec 10x              edu diagnosis, gait training with 2 crutches     5 min          Manual Intervention (35748)                                   Therapeutic Activity (36413)                                         Therapeutic Exercise and NMR EXR  [] (36170) Provided verbal/tactile cueing for activities related to strengthening, flexibility, endurance, ROM for improvements in LE, proximal hip, and core control with self care, mobility, lifting, ambulation.  [] (59944) Provided verbal/tactile cueing for activities related to improving balance, coordination, kinesthetic sense, posture, motor skill, proprioception  to assist with LE, proximal hip, and core control in self care, mobility, lifting, ambulation and eccentric single leg control.      NMR and Therapeutic Activities:    [] (51434 or 22479) Provided verbal/tactile cueing for activities related to improving balance, coordination, kinesthetic sense, posture, motor skill, proprioception and motor activation to allow for proper function of core, proximal hip and LE with self care and ADLs  [] (84201) Gait Re-education- Provided training and instruction to the patient for proper LE, core and proximal hip recruitment and positioning and eccentric body weight control with ambulation re-education including up and down stairs     Home Exercise Program:    [x] (23701) Reviewed/Progressed HEP activities related to strengthening, flexibility, endurance, ROM of core, proximal hip and LE for functional self-care, mobility, lifting and ambulation/stair navigation   [] (34854)Reviewed/Progressed HEP activities related to improving balance, coordination, kinesthetic sense, posture, motor skill, proprioception of core, proximal hip and LE for self care, mobility, lifting, and ambulation/stair navigation      Manual Treatments:  PROM / STM / Oscillations-Mobs:  G-I, II, III, IV (PA's, Inf., Post.)  [] (26573) Provided manual therapy to mobilize LE, proximal hip and/or LS spine soft tissue/joints for the purpose of modulating pain, promoting relaxation,  increasing ROM, reducing/eliminating soft tissue swelling/inflammation/restriction, improving soft tissue extensibility and allowing for proper ROM for normal function with self care, mobility, lifting and ambulation. Modalities:  CP to L knee for 15 min    [x] GAME READY (VASO)- for significant edema, swelling, pain control. Charges  Timed Code Treatment Minutes: 38   Total Treatment Minutes: 43      [] EVAL (LOW) 44168   [] EVAL (MOD) 62687  [] EVAL (HIGH) 92952   [] RE-EVAL     [x] MQ(93044) x2     [] IONTO  [x] NMR (41024) x 1    [x] VASO x1[] Manual (79162) x      [] Other:  [] TA x      [] Mech Traction (03425)  [] ES(attended) (82656)      [] ES (un) (33163):       GOALS:  Patient stated goal: be able to walk 1-2 miles  [] Progressing: [] Met: [] Not Met: [] Adjusted    Therapist goals for Patient:   Short Term Goals: To be achieved in: 2 weeks  1. Independent in HEP and progression per patient tolerance, in order to prevent re-injury. [] Progressing: [] Met: [] Not Met: [] Adjusted  2. Patient will have a decrease in pain to facilitate improvement in movement, function, and ADLs as indicated by Functional Deficits. [] Progressing: [] Met: [] Not Met: [] Adjusted    Long Term Goals: To be achieved in: 10 weeks  1.  Disability index score of 72% or more per FOTO to assist with reaching prior level of function. [] Progressing: [] Met: [] Not Met: [] Adjusted  2. Patient will demonstrate increased AROM to L knee flex to 130 and ext to -3 to allow for proper joint functioning as indicated by patients Functional Deficits. [] Progressing: [] Met: [] Not Met: [] Adjusted  3. Patient will demonstrate an increase in Strength to good proximal hip strength and control, within 5lb HHD in LE to allow for proper functional mobility as indicated by patients Functional Deficits. [] Progressing: [] Met: [] Not Met: [] Adjusted  4. Patient will return to being able to ambulate without an AD with proper gait, ascend and descend stairs with reciprocal gait in home  without increased symptoms or restriction. [] Progressing: [] Met: [] Not Met: [] Adjusted  5. Patient able to return to work as a     [] Progressing: [] Met: [] Not Met: [] Adjusted    Progression Towards Functional goals:  [] Patient is progressing as expected towards functional goals listed. [] Progression is slowed due to complexities listed. [] Progression has been slowed due to co-morbidities. [x] Plan just implemented, too soon to assess goals progression  [] Other:         Overall Progression Towards Functional goals/ Treatment Progress Update:  [] Patient is progressing as expected towards functional goals listed. [] Progression is slowed due to complexities/Impairments listed. [] Progression has been slowed due to co-morbidities.   [x] Plan just implemented, too soon to assess goals progression <30days   [] Goals require adjustment due to lack of progress  [] Patient is not progressing as expected and requires additional follow up with physician  [] Other    Prognosis for POC: [x] Good [] Fair  [] Poor      Patient requires continued skilled intervention: [x] Yes  [] No    Treatment/Activity Tolerance:  [x] Patient able to complete treatment  [] Patient limited by fatigue  [] Patient limited by pain    [] Patient limited by other medical complications  [] Other:     ASSESSMENT: Patient able to tolerate increased activity today as compared to previous session. He has mild effusion. He is still limited to 25% WB with 2 crutches per MD.  He requires skilled intervention of PT to guide gait retraining, and functional strength and ROM, and decrease pain. Access Code: [email or web address]  URL: [email or web address]  Date: 05/25/2022  Prepared by: Miri Ayala    Exercises  Seated Table Hamstring Stretch - 2 x daily - 7 x weekly - 1 sets - 3 reps - 30 hold  Long Sitting Calf Stretch with Strap - 2 x daily - 7 x weekly - 3 sets - 3 reps - 30 hold  Long Sitting Quad Set - 2 x daily - 7 x weekly - 1 sets - 10 reps - 10 hold  Supine Active Straight Leg Raise - 2 x daily - 7 x weekly - 2 sets - 10 reps - 2 hold        PLAN: See eval  [x] Continue per plan of care [] Alter current plan (see comments above)  [] Plan of care initiated [] Hold pending MD visit [] Discharge      Electronically signed by:  Jun Fabian PT    Note: If patient does not return for scheduled/ recommended follow up visits, this note will serve as a discharge from care along with most recent update on progress.

## 2022-06-02 ENCOUNTER — HOSPITAL ENCOUNTER (OUTPATIENT)
Dept: PHYSICAL THERAPY | Age: 52
Setting detail: THERAPIES SERIES
Discharge: HOME | End: 2022-06-02
Payer: COMMERCIAL

## 2022-06-02 NOTE — FLOWSHEET NOTE
[facility],  [address]        Physical Therapy  Cancellation/No-show Note  Patient Name:  Marylen Floor  :  1970   Date:  2022  Cancelled visits to date: 1 initial eval  No-shows to date: 1    For today's appointment patient:  []  Cancelled  []  Rescheduled appointment  [x]  No-show     Reason given by patient:  []  Patient ill  []  Conflicting appointment  []  No transportation    []  Conflict with work  []  No reason given  []  Other:    Comments:      Electronically signed by:  Brandi Suero, PT, DPT

## 2022-06-06 ENCOUNTER — TELEPHONE (OUTPATIENT)
Dept: ORTHOPEDIC SURGERY | Age: 52
End: 2022-06-06

## 2022-06-07 ENCOUNTER — HOSPITAL ENCOUNTER (OUTPATIENT)
Dept: PHYSICAL THERAPY | Age: 52
Setting detail: THERAPIES SERIES
Discharge: HOME | End: 2022-06-07
Payer: COMMERCIAL

## 2022-06-07 PROCEDURE — 97112 NEUROMUSCULAR REEDUCATION: CPT | Performed by: PHYSICAL THERAPIST

## 2022-06-07 PROCEDURE — 97016 VASOPNEUMATIC DEVICE THERAPY: CPT | Performed by: PHYSICAL THERAPIST

## 2022-06-07 PROCEDURE — 97110 THERAPEUTIC EXERCISES: CPT | Performed by: PHYSICAL THERAPIST

## 2022-06-07 NOTE — FLOWSHEET NOTE
[facility], [address]  Phone: [phone]  Fax [phone]    Physical Therapy Treatment Note/ Progress Report:           Date:  2022    Patient Name:  Jannette Schroeder    :  1970  MRN: [id number]  Restrictions/Precautions:    Medical/Treatment Diagnosis Information:  Diagnosis: M17.12 (ICD-10-CM) - Localized osteoarthritis of left [id number] (ICD-10-CM) - Complex tear of medial meniscus of left knee as current injury, initial [id number] (ICD-10-CM) - Insufficiency fracture of medial femoral condyle ([address]  Treatment Diagnosis: R knee pain N30.007  Insurance/Certification information:  PT Insurance Information: [employer]: 100% BMN auth required  Physician Information:   Dr. Jay Lara   Has the plan of care been signed (Y/N):        []  Yes  [x]  No     Date of Patient follow up with Physician: 22      Is this a Progress Report:     []  Yes  [x]  No        If Yes:  Date Range for reporting period:  Beginning 22  Ending 22    Progress report will be due (10 Rx or 30 days whichever is less): 3/96/93       Recertification will be due (POC Duration  / 90 days whichever is less): 8/3/22      Visit # Insurance Allowable Auth Required   In-person 3/8 8 visits -22 [x]  Yes []  No    Telehealth   []  Yes []  No    Total          Functional Scale: FOTO 67/100   Date assessed:  22      Therapy Diagnosis/Practice Pattern: 4E      Number of Comorbidities:  []0     []1-2    [x]3+    Latex Allergy:  [x]NO      []YES  Preferred Language for Healthcare:   [x]English       []other:      Pain level:  1-2/10 R knee     SUBJECTIVE:  Patient arrived today wihtout crutches and said he left them in his fiance's car last night. OBJECTIVE:    Observation: : patient demonstrated ambulation with knee in flexion.   Was able to demonstrate proper ambulation with verbal cues and walker   Test measurements:      RESTRICTIONS/PRECAUTIONS: A-fib, HTN, DM II ; 25% WBing until next MD appt     Exercises/Interventions:     Therapeutic Ex (18357)/ NMR re-education (21009) Sets/sec Notes/CUES   Sitting HS stretch  30\"x3  Hep    Sitting gastroc stretch  30\"x3  Hep    Sitting QS with towel roll  10\"x10 Hep    Supine QS with SLR   2 x10 ea leg Hep       SAQ with add squeeze 3 sec hold 2# 3x10    LAQ 2# 3x10    Standing ham curls (all weight on right leg) 0# 1x10    Supine bridge with both legs on ball (knees straight) 3 sec 10x    Hip machine TKE 45# 5 sec 10x    Hip machine abd 45# 2x10    Bike ROM x 5 min       edu diagnosis, gait training with 2 crutches. 5 min  Reemphasized 25% WB with crutches        Manual Intervention (07656)     Patellar mobs 3 min Good patellar mobility                            Therapeutic Activity (38991)                                         Therapeutic Exercise and NMR EXR  [x] (62875) Provided verbal/tactile cueing for activities related to strengthening, flexibility, endurance, ROM for improvements in LE, proximal hip, and core control with self care, mobility, lifting, ambulation.  [] (32197) Provided verbal/tactile cueing for activities related to improving balance, coordination, kinesthetic sense, posture, motor skill, proprioception  to assist with LE, proximal hip, and core control in self care, mobility, lifting, ambulation and eccentric single leg control.      NMR and Therapeutic Activities:    [] (86958 or 72501) Provided verbal/tactile cueing for activities related to improving balance, coordination, kinesthetic sense, posture, motor skill, proprioception and motor activation to allow for proper function of core, proximal hip and LE with self care and ADLs  [] (68063) Gait Re-education- Provided training and instruction to the patient for proper LE, core and proximal hip recruitment and positioning and eccentric body weight control with ambulation re-education including up and down stairs     Home Exercise Program:    [x] (58706) Reviewed/Progressed HEP activities related to strengthening, flexibility, endurance, ROM of core, proximal hip and LE for functional self-care, mobility, lifting and ambulation/stair navigation   [] (58459)Reviewed/Progressed HEP activities related to improving balance, coordination, kinesthetic sense, posture, motor skill, proprioception of core, proximal hip and LE for self care, mobility, lifting, and ambulation/stair navigation      Manual Treatments:  PROM / STM / Oscillations-Mobs:  G-I, II, III, IV (PA's, Inf., Post.)  [] (04308) Provided manual therapy to mobilize LE, proximal hip and/or LS spine soft tissue/joints for the purpose of modulating pain, promoting relaxation,  increasing ROM, reducing/eliminating soft tissue swelling/inflammation/restriction, improving soft tissue extensibility and allowing for proper ROM for normal function with self care, mobility, lifting and ambulation. Modalities:  CP to L knee for 15 min    [x] GAME READY (VASO)- for significant edema, swelling, pain control. Charges  Timed Code Treatment Minutes: 38   Total Treatment Minutes: 43      [] EVAL (LOW) 92152   [] EVAL (MOD) 83165  [] EVAL (HIGH) 61976   [] RE-EVAL     [x] EN(43674) x2     [] IONTO  [x] NMR (77416) x 1    [x] VASO x1[] Manual (98144) x  1    [] Other:  [] TA x      [] Mech Traction (37192)  [] ES(attended) (64765)      [] ES (un) (83404):       GOALS:  Patient stated goal: be able to walk 1-2 miles  [] Progressing: [] Met: [] Not Met: [] Adjusted    Therapist goals for Patient:   Short Term Goals: To be achieved in: 2 weeks  1. Independent in HEP and progression per patient tolerance, in order to prevent re-injury. [] Progressing: [] Met: [] Not Met: [] Adjusted  2. Patient will have a decrease in pain to facilitate improvement in movement, function, and ADLs as indicated by Functional Deficits.   [] Progressing: [] Met: [] Not Met: [] Adjusted    Long Term Goals: To be achieved in: 10 weeks  1. Disability index score of 72% or more per FOTO to assist with reaching prior level of function. [] Progressing: [] Met: [] Not Met: [] Adjusted  2. Patient will demonstrate increased AROM to L knee flex to 130 and ext to -3 to allow for proper joint functioning as indicated by patients Functional Deficits. [] Progressing: [] Met: [] Not Met: [] Adjusted  3. Patient will demonstrate an increase in Strength to good proximal hip strength and control, within 5lb HHD in LE to allow for proper functional mobility as indicated by patients Functional Deficits. [] Progressing: [] Met: [] Not Met: [] Adjusted  4. Patient will return to being able to ambulate without an AD with proper gait, ascend and descend stairs with reciprocal gait in home  without increased symptoms or restriction. [] Progressing: [] Met: [] Not Met: [] Adjusted  5. Patient able to return to work as a     [] Progressing: [] Met: [] Not Met: [] Adjusted    Progression Towards Functional goals:  [] Patient is progressing as expected towards functional goals listed. [] Progression is slowed due to complexities listed. [] Progression has been slowed due to co-morbidities. [x] Plan just implemented, too soon to assess goals progression  [] Other:         Overall Progression Towards Functional goals/ Treatment Progress Update:  [] Patient is progressing as expected towards functional goals listed. [] Progression is slowed due to complexities/Impairments listed. [] Progression has been slowed due to co-morbidities.   [x] Plan just implemented, too soon to assess goals progression <30days   [] Goals require adjustment due to lack of progress  [] Patient is not progressing as expected and requires additional follow up with physician  [] Other    Prognosis for POC: [x] Good [] Fair  [] Poor      Patient requires continued skilled intervention: [x] Yes  [] No    Treatment/Activity Tolerance:  [x] Patient able to complete treatment  [] Patient limited by fatigue  [] Patient limited by pain    [] Patient limited by other medical complications  [] Other:     ASSESSMENT: Gave patient a walker to use in clinic today and educated patient on the importance of sticking with weightbearing restriction to enable the best possible long term success of knee. Patient able to tolerate increased activity today as compared to previous session. He has mild effusion. He is still limited to 25% WB with 2 crutches per MD.  He requires skilled intervention of PT to guide gait retraining, and functional strength and ROM, and decrease pain. Access Code: [email or web address]  URL: [facility]/  Date: 05/25/2022  Prepared by: Miri Ayala    Exercises  Seated Table Hamstring Stretch - 2 x daily - 7 x weekly - 1 sets - 3 reps - 30 hold  Long Sitting Calf Stretch with Strap - 2 x daily - 7 x weekly - 3 sets - 3 reps - 30 hold  Long Sitting Quad Set - 2 x daily - 7 x weekly - 1 sets - 10 reps - 10 hold  Supine Active Straight Leg Raise - 2 x daily - 7 x weekly - 2 sets - 10 reps - 2 hold        PLAN: See eval  [x] Continue per plan of care [] Alter current plan (see comments above)  [] Plan of care initiated [] Hold pending MD visit [] Discharge      Electronically signed by:  Jun Fabian PT    Note: If patient does not return for scheduled/ recommended follow up visits, this note will serve as a discharge from care along with most recent update on progress.

## 2022-06-09 ENCOUNTER — HOSPITAL ENCOUNTER (OUTPATIENT)
Dept: PHYSICAL THERAPY | Age: 52
Setting detail: THERAPIES SERIES
Discharge: HOME | End: 2022-06-09
Payer: COMMERCIAL

## 2022-06-09 PROCEDURE — 97110 THERAPEUTIC EXERCISES: CPT | Performed by: PHYSICAL THERAPIST

## 2022-06-09 PROCEDURE — 97112 NEUROMUSCULAR REEDUCATION: CPT | Performed by: PHYSICAL THERAPIST

## 2022-06-09 PROCEDURE — 97016 VASOPNEUMATIC DEVICE THERAPY: CPT | Performed by: PHYSICAL THERAPIST

## 2022-06-09 NOTE — FLOWSHEET NOTE
[facility],  [address]  Phone: [phone]  Fax [phone]    Physical Therapy Treatment Note/ Progress Report:           Date:  2022    Patient Name:  Ian Hurd    :  1970  MRN: [id number]  Restrictions/Precautions:    Medical/Treatment Diagnosis Information:  Diagnosis: M17.12 (ICD-10-CM) - Localized osteoarthritis of left DCMXA75.083T (ICD-10-CM) - Complex tear of medial meniscus of left knee as current injury, initial [id number] (ICD-10-CM) - Insufficiency fracture of medial femoral condyle ([address]  Treatment Diagnosis: R knee pain O23.204  Insurance/Certification information:  PT Insurance Information: b[place]: 100% BMN auth required  Physician Information:   Dr. Hillary Phan   Has the plan of care been signed (Y/N):        []  Yes  [x]  No     Date of Patient follow up with Physician: 22      Is this a Progress Report:     []  Yes  [x]  No        If Yes:  Date Range for reporting period:  Beginning 22  Ending 22    Progress report will be due (10 Rx or 30 days whichever is less):        Recertification will be due (POC Duration  / 90 days whichever is less): 8/3/22      Visit # Insurance Allowable Auth Required   In-person  8 visits -22 [x]  Yes []  No    Telehealth   []  Yes []  No    Total          Functional Scale: FOTO 67/100   Date assessed:  22      Therapy Diagnosis/Practice Pattern: 4E      Number of Comorbidities:  []0     []1-2    [x]3+    Latex Allergy:  [x]NO      []YES  Preferred Language for Healthcare:   [x]English       []other:      Pain level:  1-2/10 R knee     SUBJECTIVE:  Patient arrived today wihtout crutches and said he left them in his fiance's car last night.       OBJECTIVE:    Observation: : still has mild effusion in knee and does not rest in full extension, but gets there easily with passive ext stretches   Test Patient left  stating that she is taking the lamotrigine 25 mg daily which is the newest medication. She has been taking it for 2 weeks now. Patient also stated that she does alternative the alprazolam 5mg.    measurements:      RESTRICTIONS/PRECAUTIONS: A-fib, HTN, DM II ; 25% WBing until next MD appt     Exercises/Interventions:     Therapeutic Ex (92227)/ NMR re-education (94394) Sets/sec Notes/CUES   Sitting HS stretch  30\"x3  Hep    Sitting gastroc stretch  30\"x3  Hep    Sitting QS with towel roll  10\"x10 Hep    Supine QS with SLR   2 x10 ea leg Hep       SAQ with add squeeze 3 sec hold 3# 2x15    LAQ 3# 2x15    Standing ham curls (all weight on right leg) 3# 1x15       Hip machine TKE 45# 5 sec 2x15 60# next session   Hip machine abd 45# 2x15    Bike ROM x 5 min       edu diagnosis, gait training with 2 crutches. 5 min  Reemphasized 25% WB with crutches        Manual Intervention (39739)     Patellar mobs/passive knee ext stretches 5 min Good patellar mobility                            Therapeutic Activity (06108)                                         Therapeutic Exercise and NMR EXR  [x] (05786) Provided verbal/tactile cueing for activities related to strengthening, flexibility, endurance, ROM for improvements in LE, proximal hip, and core control with self care, mobility, lifting, ambulation.  [] (79183) Provided verbal/tactile cueing for activities related to improving balance, coordination, kinesthetic sense, posture, motor skill, proprioception  to assist with LE, proximal hip, and core control in self care, mobility, lifting, ambulation and eccentric single leg control.      NMR and Therapeutic Activities:    [] (99577 or 47591) Provided verbal/tactile cueing for activities related to improving balance, coordination, kinesthetic sense, posture, motor skill, proprioception and motor activation to allow for proper function of core, proximal hip and LE with self care and ADLs  [] (48959) Gait Re-education- Provided training and instruction to the patient for proper LE, core and proximal hip recruitment and positioning and eccentric body weight control with ambulation re-education including up and down stairs     Home Exercise Program:    [x] (06883) Reviewed/Progressed HEP activities related to strengthening, flexibility, endurance, ROM of core, proximal hip and LE for functional self-care, mobility, lifting and ambulation/stair navigation   [] (87810)Reviewed/Progressed HEP activities related to improving balance, coordination, kinesthetic sense, posture, motor skill, proprioception of core, proximal hip and LE for self care, mobility, lifting, and ambulation/stair navigation      Manual Treatments:  PROM / STM / Oscillations-Mobs:  G-I, II, III, IV (PA's, Inf., Post.)  [] (20299) Provided manual therapy to mobilize LE, proximal hip and/or LS spine soft tissue/joints for the purpose of modulating pain, promoting relaxation,  increasing ROM, reducing/eliminating soft tissue swelling/inflammation/restriction, improving soft tissue extensibility and allowing for proper ROM for normal function with self care, mobility, lifting and ambulation. Modalities:  CP to L knee for 15 min    [x] GAME READY (VASO)- for significant edema, swelling, pain control. Charges  Timed Code Treatment Minutes: 45   Total Treatment Minutes: 60     [] EVAL (LOW) 02659   [] EVAL (MOD) 36286  [] EVAL (HIGH) 16006   [] RE-EVAL     [x] ZV(35884) x2     [] IONTO  [x] NMR (19938) x 1    [x] VASO x1[] Manual (79101) x  1    [] Other:  [] TA x      [] Mech Traction (71115)  [] ES(attended) (03716)      [] ES (un) (22989):       GOALS:  Patient stated goal: be able to walk 1-2 miles  [] Progressing: [] Met: [] Not Met: [] Adjusted    Therapist goals for Patient:   Short Term Goals: To be achieved in: 2 weeks  1. Independent in HEP and progression per patient tolerance, in order to prevent re-injury. [] Progressing: [x] Met: [] Not Met: [] Adjusted  2. Patient will have a decrease in pain to facilitate improvement in movement, function, and ADLs as indicated by Functional Deficits.   [] Progressing: [x] Met: [] Not Met: [] Adjusted    Long Term Goals: To be achieved in: 10 weeks  1. Disability index score of 72% or more per FOTO to assist with reaching prior level of function. [] Progressing: [] Met: [] Not Met: [] Adjusted  2. Patient will demonstrate increased AROM to L knee flex to 130 and ext to -3 to allow for proper joint functioning as indicated by patients Functional Deficits. [] Progressing: [] Met: [] Not Met: [] Adjusted  3. Patient will demonstrate an increase in Strength to good proximal hip strength and control, within 5lb HHD in LE to allow for proper functional mobility as indicated by patients Functional Deficits. [] Progressing: [] Met: [] Not Met: [] Adjusted  4. Patient will return to being able to ambulate without an AD with proper gait, ascend and descend stairs with reciprocal gait in home  without increased symptoms or restriction. [] Progressing: [] Met: [] Not Met: [] Adjusted  5. Patient able to return to work as a     [] Progressing: [] Met: [] Not Met: [] Adjusted    Progression Towards Functional goals:  [] Patient is progressing as expected towards functional goals listed. [] Progression is slowed due to complexities listed. [] Progression has been slowed due to co-morbidities. [x] Plan just implemented, too soon to assess goals progression  [] Other:         Overall Progression Towards Functional goals/ Treatment Progress Update:  [] Patient is progressing as expected towards functional goals listed. [] Progression is slowed due to complexities/Impairments listed. [] Progression has been slowed due to co-morbidities.   [x] Plan just implemented, too soon to assess goals progression <30days   [] Goals require adjustment due to lack of progress  [] Patient is not progressing as expected and requires additional follow up with physician  [] Other    Prognosis for POC: [x] Good [] Fair  [] Poor      Patient requires continued skilled intervention: [x] Yes  [] No    Treatment/Activity Tolerance:  [x] Patient able to complete treatment  [] Patient limited by fatigue  [] Patient limited by pain    [] Patient limited by other medical complications  [] Other:     ASSESSMENT: Able to tolerate increased activity. Patient able to tolerate increased activity today as compared to previous session. He has mild effusion. He is still limited to 25% WB with 2 crutches per MD.  He requires skilled intervention of PT to guide gait retraining, and functional strength and ROM, and decrease pain. Access Code: [email or web address]  URL: [email or web address]  Date: 05/25/2022  Prepared by: Brittney Ordoñez    Exercises  Seated Table Hamstring Stretch - 2 x daily - 7 x weekly - 1 sets - 3 reps - 30 hold  Long Sitting Calf Stretch with Strap - 2 x daily - 7 x weekly - 3 sets - 3 reps - 30 hold  Long Sitting Quad Set - 2 x daily - 7 x weekly - 1 sets - 10 reps - 10 hold  Supine Active Straight Leg Raise - 2 x daily - 7 x weekly - 2 sets - 10 reps - 2 hold        PLAN: Cont PT 1x/week until we can increase weigth bearing, then consider 2x/week if needed. [x] Continue per plan of care [] Alter current plan (see comments above)  [] Plan of care initiated [] Hold pending MD visit [] Discharge      Electronically signed by:  Armand Castellanos PT    Note: If patient does not return for scheduled/ recommended follow up visits, this note will serve as a discharge from care along with most recent update on progress.

## 2022-06-13 ENCOUNTER — APPOINTMENT (OUTPATIENT)
Dept: PHYSICAL THERAPY | Age: 52
End: 2022-06-13
Payer: COMMERCIAL

## 2022-06-15 ENCOUNTER — HOSPITAL ENCOUNTER (OUTPATIENT)
Dept: PHYSICAL THERAPY | Age: 52
Setting detail: THERAPIES SERIES
Discharge: HOME | End: 2022-06-15
Payer: COMMERCIAL

## 2022-06-15 PROCEDURE — 97112 NEUROMUSCULAR REEDUCATION: CPT | Performed by: PHYSICAL THERAPIST

## 2022-06-15 PROCEDURE — 97016 VASOPNEUMATIC DEVICE THERAPY: CPT | Performed by: PHYSICAL THERAPIST

## 2022-06-15 PROCEDURE — 97110 THERAPEUTIC EXERCISES: CPT | Performed by: PHYSICAL THERAPIST

## 2022-06-15 NOTE — FLOWSHEET NOTE
[facility], [address]  Phone: [phone]  Fax [phone]    Physical Therapy Treatment Note/ Progress Report:           Date:  6/15/2022    Patient Name:  Will Meyer    :  1970  MRN: [id number]  Restrictions/Precautions:    Medical/Treatment Diagnosis Information:  Diagnosis: M17.12 (ICD-10-CM) - Localized osteoarthritis of left [id number] (ICD-10-CM) - Complex tear of medial meniscus of left knee as current injury, initial [id number] (ICD-10-CM) - Insufficiency fracture of medial femoral condyle ([address]  Treatment Diagnosis: R knee pain M59.672  Insurance/Certification information:  PT Insurance Information: eleonorae: 100% BMN auth required  Physician Information:   Dr. Rendon Room   Has the plan of care been signed (Y/N):        []  Yes  [x]  No     Date of Patient follow up with Physician: 22      Is this a Progress Report:     []  Yes  [x]  No        If Yes:  Date Range for reporting period:  Beginning 22  Ending 22    Progress report will be due (10 Rx or 30 days whichever is less):        Recertification will be due (POC Duration  / 90 days whichever is less): 8/3/22      Visit # Insurance Allowable Auth Required   In-person  8 visits -22 [x]  Yes []  No    Telehealth   []  Yes []  No    Total          Functional Scale: FOTO 67/100   Date assessed:  22      Therapy Diagnosis/Practice Pattern: 4E      Number of Comorbidities:  []0     []1-2    [x]3+    Latex Allergy:  [x]NO      []YES  Preferred Language for Healthcare:   [x]English       []other:      Pain level:  1-2/10 L knee     SUBJECTIVE:  Pt reports his knee is feeling better, he has been using his crutches and doing his HEP.  Pt reports he is off work until the beginning of July     OBJECTIVE:    Observation:    Test measurements:  L knee ext to flex ROM -4 to 129     RESTRICTIONS/PRECAUTIONS: A-fib, HTN, DM II ; 25% WBing until next MD appt     Exercises/Interventions:     Therapeutic Ex (06511)/ NMR re-education (35260) Sets/sec Notes/CUES   Sitting HS stretch  30\"x3  Hep    Standing SL gastroc stretch with incline board  30\"x3      Sitting QS with towel roll  10\"x10 Hep    Supine QS with SLR   2 x10 ea leg 1#  Hep    Prone quad stretching 10\"x10    SAQ with add squeeze 3 sec hold 4# 2x15    LAQ 3# 2x15    Standing ham curls (all weight on right leg) 3# 2x15       Hip machine TKE 60# 5 sec 2x15 60# next session   Hip machine abd 45# 2x15    Bike ROM x 5 min       edu diagnosis, gait training with 2 crutches. 5 min  Reemphasized 25% WB with crutches   SL hip abd  2\" 3x10  +hep    Manual Intervention (38871)     Nig[place] Ek knee ext stretches; hamstring, ITB quad stretching  7 min                             Therapeutic Activity (25595)                                         Therapeutic Exercise and NMR EXR  [x] (77908) Provided verbal/tactile cueing for activities related to strengthening, flexibility, endurance, ROM for improvements in LE, proximal hip, and core control with self care, mobility, lifting, ambulation.  [] (14782) Provided verbal/tactile cueing for activities related to improving balance, coordination, kinesthetic sense, posture, motor skill, proprioception  to assist with LE, proximal hip, and core control in self care, mobility, lifting, ambulation and eccentric single leg control.      NMR and Therapeutic Activities:    [] (88317 or 61525) Provided verbal/tactile cueing for activities related to improving balance, coordination, kinesthetic sense, posture, motor skill, proprioception and motor activation to allow for proper function of core, proximal hip and LE with self care and ADLs  [] (31586) Gait Re-education- Provided training and instruction to the patient for proper LE, core and proximal hip recruitment and positioning and eccentric body weight control with ambulation re-education including up and down stairs     Home Exercise Program:    [x] (75086) Reviewed/Progressed HEP activities related to strengthening, flexibility, endurance, ROM of core, proximal hip and LE for functional self-care, mobility, lifting and ambulation/stair navigation   [] (54813)Reviewed/Progressed HEP activities related to improving balance, coordination, kinesthetic sense, posture, motor skill, proprioception of core, proximal hip and LE for self care, mobility, lifting, and ambulation/stair navigation      Manual Treatments:  PROM / STM / Oscillations-Mobs:  G-I, II, III, IV (PA's, Inf., Post.)  [x] (97458) Provided manual therapy to mobilize LE, proximal hip and/or LS spine soft tissue/joints for the purpose of modulating pain, promoting relaxation,  increasing ROM, reducing/eliminating soft tissue swelling/inflammation/restriction, improving soft tissue extensibility and allowing for proper ROM for normal function with self care, mobility, lifting and ambulation. Modalities:     [x] GAME READY (VASO)- for significant edema, swelling, pain control. Charges  Timed Code Treatment Minutes: 50   Total Treatment Minutes: 65     [] EVAL (LOW) 58979   [] EVAL (MOD) 57814  [] EVAL (HIGH) 69183   [] RE-EVAL     [x] GA(62479) x2     [] IONTO  [x] NMR (97588) x 1    [x] VASO x1   Manual (44743) x      [] Other:  [] TA x      [] Mech Traction (19142)  [] ES(attended) (99605)      [] ES (un) (03120):       GOALS:  Patient stated goal: be able to walk 1-2 miles  [] Progressing: [] Met: [] Not Met: [] Adjusted    Therapist goals for Patient:   Short Term Goals: To be achieved in: 2 weeks  1. Independent in HEP and progression per patient tolerance, in order to prevent re-injury. [] Progressing: [x] Met: [] Not Met: [] Adjusted  2. Patient will have a decrease in pain to facilitate improvement in movement, function, and ADLs as indicated by Functional Deficits.   [] Progressing: [x] Met: [] Not Met: [] Adjusted    Long Term Goals: To be achieved in: 10 weeks  1. Disability index score of 72% or more per FOTO to assist with reaching prior level of function. [] Progressing: [] Met: [] Not Met: [] Adjusted  2. Patient will demonstrate increased AROM to L knee flex to 130 and ext to -3 to allow for proper joint functioning as indicated by patients Functional Deficits. [x] Progressing: [] Met: [] Not Met: [] Adjusted  3. Patient will demonstrate an increase in Strength to good proximal hip strength and control, within 5lb HHD in LE to allow for proper functional mobility as indicated by patients Functional Deficits. [] Progressing: [] Met: [] Not Met: [] Adjusted  4. Patient will return to being able to ambulate without an AD with proper gait, ascend and descend stairs with reciprocal gait in home  without increased symptoms or restriction. [] Progressing: [] Met: [] Not Met: [] Adjusted  5. Patient able to return to work as a     [] Progressing: [] Met: [] Not Met: [] Adjusted    Progression Towards Functional goals:  [x] Patient is progressing as expected towards functional goals listed. [] Progression is slowed due to complexities listed. [] Progression has been slowed due to co-morbidities. [] Plan just implemented, too soon to assess goals progression  [] Other:         Overall Progression Towards Functional goals/ Treatment Progress Update:  [] Patient is progressing as expected towards functional goals listed. [] Progression is slowed due to complexities/Impairments listed. [] Progression has been slowed due to co-morbidities.   [x] Plan just implemented, too soon to assess goals progression <30days   [] Goals require adjustment due to lack of progress  [] Patient is not progressing as expected and requires additional follow up with physician  [] Other    Prognosis for POC: [x] Good [] Fair  [] Poor      Patient requires continued skilled intervention: [x] Yes  [] No    Treatment/Activity Tolerance:  [x] Patient able to complete treatment  [] Patient limited by fatigue  [] Patient limited by pain    [] Patient limited by other medical complications  [] Other:     ASSESSMENT:  Improving L knee ROM and more compliant with crutch use and WBing restriction                    Access Code: [email or web address]  URL: [email or web address]  Date: 05/25/2022  Prepared by: Daylin Barillas    Exercises  Seated Table Hamstring Stretch - 2 x daily - 7 x weekly - 1 sets - 3 reps - 30 hold  Long Sitting Calf Stretch with Strap - 2 x daily - 7 x weekly - 3 sets - 3 reps - 30 hold  Long Sitting Quad Set - 2 x daily - 7 x weekly - 1 sets - 10 reps - 10 hold  Supine Active Straight Leg Raise - 2 x daily - 7 x weekly - 2 sets - 10 reps - 2 hold      Access Code: [email or web address]  URL: [email or web address]  Date: 06/15/2022  Prepared by: Daylin Barillas    Exercises  Sidelying Pelvic Floor Contraction with Hip Abduction - 2 x daily - 7 x weekly - 3 sets - 10 reps - 2 hold    PLAN: Cont PT 1x/week until we can increase weigth bearing, then consider 2x/week if needed. [x] Continue per plan of care [] Alter current plan (see comments above)  [] Plan of care initiated [] Hold pending MD visit [] Discharge      Electronically signed by:  Daylin Barillas PT    Note: If patient does not return for scheduled/ recommended follow up visits, this note will serve as a discharge from care along with most recent update on progress.

## 2022-06-20 ENCOUNTER — APPOINTMENT (OUTPATIENT)
Dept: PHYSICAL THERAPY | Age: 52
End: 2022-06-20
Payer: COMMERCIAL

## 2022-06-22 ENCOUNTER — HOSPITAL ENCOUNTER (OUTPATIENT)
Dept: PHYSICAL THERAPY | Age: 52
Setting detail: THERAPIES SERIES
Discharge: HOME | End: 2022-06-22
Payer: COMMERCIAL

## 2022-06-22 PROCEDURE — 97016 VASOPNEUMATIC DEVICE THERAPY: CPT | Performed by: PHYSICAL THERAPIST

## 2022-06-22 PROCEDURE — 97112 NEUROMUSCULAR REEDUCATION: CPT | Performed by: PHYSICAL THERAPIST

## 2022-06-22 PROCEDURE — 97110 THERAPEUTIC EXERCISES: CPT | Performed by: PHYSICAL THERAPIST

## 2022-06-22 NOTE — FLOWSHEET NOTE
[facility], [address]  Phone: [phone]  Fax [phone]    Physical Therapy Treatment Note/ Progress Report:           Date:  2022    Patient Name:  Moni Bal    :  1970  MRN: [id number]  Restrictions/Precautions:    Medical/Treatment Diagnosis Information:  Diagnosis: M17.12 (ICD-10-CM) - Localized osteoarthritis of left [id number] (ICD-10-CM) - Complex tear of medial meniscus of left knee as current injury, initial [id number] (ICD-10-CM) - Insufficiency fracture of medial femoral condyle ([address]  Treatment Diagnosis: R knee pain X52.890  Insurance/Certification information:  PT Insurance Information: buckeye: 100% BMN auth required  Physician Information:   Dr. Jolie Humphries   Has the plan of care been signed (Y/N):        []  Yes  [x]  No     Date of Patient follow up with Physician: 22      Is this a Progress Report:     []  Yes  [x]  No        If Yes:  Date Range for reporting period:  Beginning 22  Ending 22    Progress report will be due (10 Rx or 30 days whichever is less): 35       Recertification will be due (POC Duration  / 90 days whichever is less): 8/3/22      Visit # Insurance Allowable Auth Required   In-person  8 visits -22 [x]  Yes []  No    Telehealth   []  Yes []  No    Total          Functional Scale: FOTO 67/100   Date assessed:  22      Therapy Diagnosis/Practice Pattern: 4E      Number of Comorbidities:  []0     []1-2    [x]3+    Latex Allergy:  [x]NO      []YES  Preferred Language for Healthcare:   [x]English       []other:      Pain level: 1-2 /10 L knee     SUBJECTIVE:  Pt reports he is doing better with walkig with the crutches with 25% WBing.  He has been going to [employer] and riding the bike     OBJECTIVE:    Observation:    Test measurements:      RESTRICTIONS/PRECAUTIONS: A-fib, HTN, DM II ; 25% WBing until next MD appt     Exercises/Interventions:     Therapeutic Ex (77918)/ NMR re-education (02654) Sets/sec Notes/CUES   Hamstring stretch in cage  15\" x5     Standing SL gastroc stretch with incline board  30\"x3      Sitting QS with towel roll   Hep    Supine QS with SLR   2 x10 ea leg 2#  Hep    Prone quad stretching 10\"x10    SAQ with add squeeze 3 sec hold 5# 2x15    LAQ 4# 2x15    Standing ham curls (all weight on right leg) 3# 2x15    Standing hip ext with TB Red versa 3x10 L only    Hip machine TKE 60# 5 sec 2x15    Hip machine abd 45# 2x15    Bike ROM x 5 min    Supine bridge with SB with knees ext        5\" 2x10     SL hip abd  2\" 3x10 1#  +hep    Manual Intervention (61713)     passive knee ext stretches; hamstring, ITB quad stretching  6 min                             Therapeutic Activity (68795)                                         Therapeutic Exercise and NMR EXR  [x] (59530) Provided verbal/tactile cueing for activities related to strengthening, flexibility, endurance, ROM for improvements in LE, proximal hip, and core control with self care, mobility, lifting, ambulation.  [] (82029) Provided verbal/tactile cueing for activities related to improving balance, coordination, kinesthetic sense, posture, motor skill, proprioception  to assist with LE, proximal hip, and core control in self care, mobility, lifting, ambulation and eccentric single leg control.      NMR and Therapeutic Activities:    [] (46490 or 77838) Provided verbal/tactile cueing for activities related to improving balance, coordination, kinesthetic sense, posture, motor skill, proprioception and motor activation to allow for proper function of core, proximal hip and LE with self care and ADLs  [] (89169) Gait Re-education- Provided training and instruction to the patient for proper LE, core and proximal hip recruitment and positioning and eccentric body weight control with ambulation re-education including up and down stairs     Home Exercise Program:    [x] (75256) Reviewed/Progressed HEP activities related to strengthening, flexibility, endurance, ROM of core, proximal hip and LE for functional self-care, mobility, lifting and ambulation/stair navigation   [] (84445)Reviewed/Progressed HEP activities related to improving balance, coordination, kinesthetic sense, posture, motor skill, proprioception of core, proximal hip and LE for self care, mobility, lifting, and ambulation/stair navigation      Manual Treatments:  PROM / STM / Oscillations-Mobs:  G-I, II, III, IV (PA's, Inf., Post.)  [x] (61266) Provided manual therapy to mobilize LE, proximal hip and/or LS spine soft tissue/joints for the purpose of modulating pain, promoting relaxation,  increasing ROM, reducing/eliminating soft tissue swelling/inflammation/restriction, improving soft tissue extensibility and allowing for proper ROM for normal function with self care, mobility, lifting and ambulation. Modalities:     [x] GAME READY (VASO)- for significant edema, swelling, pain control. Charges  Timed Code Treatment Minutes: 45   Total Treatment Minutes: 60     [] EVAL (LOW) 90836   [] EVAL (MOD) 69273  [] EVAL (HIGH) 08271   [] RE-EVAL     [x] KOBY(72410) x2     [] IONTO  [x] NMR (69839) x 1    [x] VASO x1   Manual (05678) x      [] Other:  [] TA x      [] Mech Traction (60691)  [] ES(attended) (57653)      [] ES (un) (14756):       GOALS:  Patient stated goal: be able to walk 1-2 miles  [] Progressing: [] Met: [] Not Met: [] Adjusted    Therapist goals for Patient:   Short Term Goals: To be achieved in: 2 weeks  1. Independent in HEP and progression per patient tolerance, in order to prevent re-injury. [] Progressing: [x] Met: [] Not Met: [] Adjusted  2. Patient will have a decrease in pain to facilitate improvement in movement, function, and ADLs as indicated by Functional Deficits. [] Progressing: [x] Met: [] Not Met: [] Adjusted    Long Term Goals: To be achieved in: 10 weeks  1. Disability index score of 72% or more per FOTO to assist with reaching prior level of function. [] Progressing: [] Met: [] Not Met: [] Adjusted  2. Patient will demonstrate increased AROM to L knee flex to 130 and ext to -3 to allow for proper joint functioning as indicated by patients Functional Deficits. [x] Progressing: [] Met: [] Not Met: [] Adjusted  3. Patient will demonstrate an increase in Strength to good proximal hip strength and control, within 5lb HHD in LE to allow for proper functional mobility as indicated by patients Functional Deficits. [] Progressing: [] Met: [] Not Met: [] Adjusted  4. Patient will return to being able to ambulate without an AD with proper gait, ascend and descend stairs with reciprocal gait in home  without increased symptoms or restriction. [] Progressing: [] Met: [] Not Met: [] Adjusted  5. Patient able to return to work as a     [] Progressing: [] Met: [] Not Met: [] Adjusted    Progression Towards Functional goals:  [x] Patient is progressing as expected towards functional goals listed. [] Progression is slowed due to complexities listed. [] Progression has been slowed due to co-morbidities. [] Plan just implemented, too soon to assess goals progression  [] Other:         Overall Progression Towards Functional goals/ Treatment Progress Update:  [] Patient is progressing as expected towards functional goals listed. [] Progression is slowed due to complexities/Impairments listed. [] Progression has been slowed due to co-morbidities.   [x] Plan just implemented, too soon to assess goals progression <30days   [] Goals require adjustment due to lack of progress  [] Patient is not progressing as expected and requires additional follow up with physician  [] Other    Prognosis for POC: [x] Good [] Fair  [] Poor      Patient requires continued skilled intervention: [x] Yes  [] No    Treatment/Activity Tolerance:  [x] Patient able to complete treatment  [] Patient limited by fatigue  [] Patient limited by pain    [] Patient limited by other medical complications  [] Other:     ASSESSMENT: improving gait with 2 crutches                    Access Code: [email or web address]  URL: [email or web address]  Date: 05/25/2022  Prepared by: Maxx Nesbitt    Exercises  Seated Table Hamstring Stretch - 2 x daily - 7 x weekly - 1 sets - 3 reps - 30 hold  Long Sitting Calf Stretch with Strap - 2 x daily - 7 x weekly - 3 sets - 3 reps - 30 hold  Long Sitting Quad Set - 2 x daily - 7 x weekly - 1 sets - 10 reps - 10 hold  Supine Active Straight Leg Raise - 2 x daily - 7 x weekly - 2 sets - 10 reps - 2 hold      Access Code: [email or web address]  URL: [email or web address]  Date: 06/15/2022  Prepared by: Maxx Nesbitt    Exercises  Sidelying Pelvic Floor Contraction with Hip Abduction - 2 x daily - 7 x weekly - 3 sets - 10 reps - 2 hold    PLAN: Cont PT 1x/week until we can increase weigth bearing, then consider 2x/week if needed. [x] Continue per plan of care [] Alter current plan (see comments above)  [] Plan of care initiated [] Hold pending MD visit [] Discharge      Electronically signed by:  Maxx Nesbitt PT    Note: If patient does not return for scheduled/ recommended follow up visits, this note will serve as a discharge from care along with most recent update on progress.

## 2022-06-29 ENCOUNTER — HOSPITAL ENCOUNTER (OUTPATIENT)
Dept: PHYSICAL THERAPY | Age: 52
Setting detail: THERAPIES SERIES
Discharge: HOME | End: 2022-06-29
Payer: COMMERCIAL

## 2022-06-29 PROCEDURE — 97110 THERAPEUTIC EXERCISES: CPT | Performed by: PHYSICAL THERAPIST

## 2022-06-29 PROCEDURE — 97016 VASOPNEUMATIC DEVICE THERAPY: CPT | Performed by: PHYSICAL THERAPIST

## 2022-06-29 PROCEDURE — 97112 NEUROMUSCULAR REEDUCATION: CPT | Performed by: PHYSICAL THERAPIST

## 2022-06-29 NOTE — PROGRESS NOTES
[facility], [address] [place]  Phone: [phone]  Fax [phone]    Physical Therapy Treatment Note/ Progress Report:           Date:  2022    Patient Name:  Marcia Galdamez    :  1970  MRN: [id number]  Restrictions/Precautions:    Medical/Treatment Diagnosis Information:  Diagnosis: M17.12 (ICD-10-CM) - Localized osteoarthritis of left PGJLU83.940H (ICD-10-CM) - Complex tear of medial meniscus of left knee as current injury, initial cacliznewE26.453A (ICD-10-CM) - Insufficiency fracture of medial femoral condyle ([address]  Treatment Diagnosis: L knee pain H93.755  Insurance/Certification information:  PT Insurance Information: buckeye: 100% BMN auth required  Physician Information:   Dr. Kristyn Wylie   Has the plan of care been signed (Y/N):        []  Yes  [x]  No     Date of Patient follow up with Physician: 22      Is this a Progress Report:     [x]  Yes  []  No        If Yes:  Date Range for reporting period:  Beginning 22  Ending 22    Progress report will be due (10 Rx or 30 days whichever is less):        Recertification will be due (POC Duration  / 90 days whichever is less): 8/3/22      Visit # Insurance Allowable Auth Required   In-person  8 visits -22 [x]  Yes []  No    Telehealth   []  Yes []  No    Total          Functional Scale: FOTO 63/100   Date assessed:  22      Therapy Diagnosis/Practice Pattern: 4E      Number of Comorbidities:  []0     []1-2    [x]3+    Latex Allergy:  [x]NO      []YES  Preferred Language for Healthcare:   [x]English       []other:      Pain level: 0/10 L knee     SUBJECTIVE:  Pt reports MD appt moved secondary MD OOT.  Pt reports knee feels good, less pain     OBJECTIVE:    Observation:    Test measurements:  L knee ext to flex ROM -2 to 130   MMT L hip flex 4+  abd 4+  Knee ext 4+ knee flex 4+     RESTRICTIONS/PRECAUTIONS: A-fib, HTN, DM II ; 25% WBing until next MD appt     Exercises/Interventions:     Therapeutic Ex (12043)/ NMR re-education (12352) Sets/sec Notes/CUES   Hamstring stretch in cage  15\" x5     Standing SL gastroc stretch with incline board  30\"x3      Standing HR at 1/2 wall  2x15     Supine QS with SLR with ER   2 x10  1#  Hep    Prone quad stretching 10\"x10    SAQ with add squeeze 3 sec hold 5# 2x15    LAQ 4# 2x15    Standing ham curls with TB dead lift  RTB 2x15     Standing hip ext with TB Red versa 3x10 L only    Hip machine TKE     Hip machine abd 60# 3x10     Bike ROM x 5 min    Supine bridge with SB knees to chest         2x10     SL hip abd  2\" 3x10 2#  +hep    Manual Intervention (23728)     passive knee ext stretches; hamstring, ITB quad stretching  5 min                             Therapeutic Activity (09823)                                         Therapeutic Exercise and NMR EXR  [x] (17129) Provided verbal/tactile cueing for activities related to strengthening, flexibility, endurance, ROM for improvements in LE, proximal hip, and core control with self care, mobility, lifting, ambulation.  [] (06772) Provided verbal/tactile cueing for activities related to improving balance, coordination, kinesthetic sense, posture, motor skill, proprioception  to assist with LE, proximal hip, and core control in self care, mobility, lifting, ambulation and eccentric single leg control.      NMR and Therapeutic Activities:    [x] (02937 or 61445) Provided verbal/tactile cueing for activities related to improving balance, coordination, kinesthetic sense, posture, motor skill, proprioception and motor activation to allow for proper function of core, proximal hip and LE with self care and ADLs  [] (36142) Gait Re-education- Provided training and instruction to the patient for proper LE, core and proximal hip recruitment and positioning and eccentric body weight control with ambulation re-education including up and down stairs     Home Exercise Program:    [x] (10068) Reviewed/Progressed HEP activities related to strengthening, flexibility, endurance, ROM of core, proximal hip and LE for functional self-care, mobility, lifting and ambulation/stair navigation   [] (94948)Reviewed/Progressed HEP activities related to improving balance, coordination, kinesthetic sense, posture, motor skill, proprioception of core, proximal hip and LE for self care, mobility, lifting, and ambulation/stair navigation      Manual Treatments:  PROM / STM / Oscillations-Mobs:  G-I, II, III, IV (PA's, Inf., Post.)  [x] (01574) Provided manual therapy to mobilize LE, proximal hip and/or LS spine soft tissue/joints for the purpose of modulating pain, promoting relaxation,  increasing ROM, reducing/eliminating soft tissue swelling/inflammation/restriction, improving soft tissue extensibility and allowing for proper ROM for normal function with self care, mobility, lifting and ambulation. Modalities:     [x] GAME READY (VASO)- for significant edema, swelling, pain control. Charges  Timed Code Treatment Minutes: 45   Total Treatment Minutes: 60     [] EVAL (LOW) 18729   [] EVAL (MOD) 66347  [] EVAL (HIGH) 32287   [] RE-EVAL     [x] QR(43100) x2     [] IONTO  [x] NMR (96006) x 1    [x] VASO x1   Manual (95222) x      [] Other:  [] TA x      [] Mech Traction (19168)  [] ES(attended) (40352)      [] ES (un) (47357):       GOALS:  Patient stated goal: be able to walk 1-2 miles  [] Progressing: [] Met: [] Not Met: [] Adjusted    Therapist goals for Patient:   Short Term Goals: To be achieved in: 2 weeks  1. Independent in HEP and progression per patient tolerance, in order to prevent re-injury. [] Progressing: [x] Met: [] Not Met: [] Adjusted  2. Patient will have a decrease in pain to facilitate improvement in movement, function, and ADLs as indicated by Functional Deficits. [] Progressing: [x] Met: [] Not Met: [] Adjusted    Long Term Goals:  To be achieved in: 10 weeks  1. Disability index score of 72% or more per FOTO to assist with reaching prior level of function. [] Progressing: [] Met: [x] Not Met: [] Adjusted  2. Patient will demonstrate increased AROM to L knee flex to 130 and ext to -3 to allow for proper joint functioning as indicated by patients Functional Deficits. [] Progressing: [x] Met: [] Not Met: [] Adjusted  3. Patient will demonstrate an increase in Strength to good proximal hip strength and control, within 5lb HHD in LE to allow for proper functional mobility as indicated by patients Functional Deficits. [x] Progressing: [] Met: [] Not Met: [] Adjusted  4. Patient will return to being able to ambulate without an AD with proper gait, ascend and descend stairs with reciprocal gait in home  without increased symptoms or restriction. [x] Progressing: [] Met: [] Not Met: [] Adjusted  5. Patient able to return to work as a     [] Progressing: [] Met: [x] Not Met: [] Adjusted    Progression Towards Functional goals:  [x] Patient is progressing as expected towards functional goals listed. [] Progression is slowed due to complexities listed. [] Progression has been slowed due to co-morbidities. [] Plan just implemented, too soon to assess goals progression  [] Other:         Overall Progression Towards Functional goals/ Treatment Progress Update:  [x] Patient is progressing as expected towards functional goals listed. [] Progression is slowed due to complexities/Impairments listed. [] Progression has been slowed due to co-morbidities.   [] Plan just implemented, too soon to assess goals progression <30days   [] Goals require adjustment due to lack of progress  [] Patient is not progressing as expected and requires additional follow up with physician  [] Other    Prognosis for POC: [x] Good [] Fair  [] Poor      Patient requires continued skilled intervention: [x] Yes  [] No    Treatment/Activity Tolerance:  [x] Patient able to complete treatment  [] Patient limited by fatigue  [] Patient limited by pain    [] Patient limited by other medical complications  [] Other:     ASSESSMENT: decreasing c/o knee pain, increasing knee ROm and LE strength but did score lower on FOTO but pt is functionally progressing but still 25% WBIng with 2 crutches until MD appt. 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                    Access Code: [email or web address]  URL: [email or web address]  Date: 05/25/2022  Prepared by: Mello Castellano    Exercises  Seated Table Hamstring Stretch - 2 x daily - 7 x weekly - 1 sets - 3 reps - 30 hold  Long Sitting Calf Stretch with Strap - 2 x daily - 7 x weekly - 3 sets - 3 reps - 30 hold  Long Sitting Quad Set - 2 x daily - 7 x weekly - 1 sets - 10 reps - 10 hold  Supine Active Straight Leg Raise - 2 x daily - 7 x weekly - 2 sets - 10 reps - 2 hold      Access Code: [email or web address]  URL: [email or web address]  Date: 06/15/2022  Prepared by: Mello Castellano    Exercises  Sidelying Pelvic Floor Contraction with Hip Abduction - 2 x daily - 7 x weekly - 3 sets - 10 reps - 2 hold    PLAN: Cont PT 1x/week until we can increase weigth bearing, then consider 2x/week if needed. [x] Continue per plan of care [] Alter current plan (see comments above)  [] Plan of care initiated [] Hold pending MD visit [] Discharge      Electronically signed by:  Mello Castellano, [id number]     Note: If patient does not return for scheduled/ recommended follow up visits, this note will serve as a discharge from care along with most recent update on progress.

## 2022-07-05 ENCOUNTER — TELEPHONE (OUTPATIENT)
Dept: ORTHOPEDIC SURGERY | Age: 52
End: 2022-07-05

## 2022-07-05 ENCOUNTER — OFFICE VISIT (OUTPATIENT)
Dept: ORTHOPEDIC SURGERY | Age: 52
End: 2022-07-05
Payer: COMMERCIAL

## 2022-07-05 DIAGNOSIS — M84.453A INSUFFICIENCY FRACTURE OF MEDIAL FEMORAL CONDYLE (HCC): Primary | ICD-10-CM

## 2022-07-05 PROCEDURE — 99214 OFFICE O/P EST MOD 30 MIN: CPT | Performed by: ORTHOPAEDIC SURGERY

## 2022-07-05 NOTE — PROGRESS NOTES
FOLLOW UP ORTHOPAEDIC NOTE    The patient follows up today for reevaluation of left knee pain. The patient states he has been using crutches as partial toe-touch weightbearing since last visit consultation. He received his lab work. He denies pain in the knee as he has been occasionally walking without crutches with limited stepping at home. He is pleased with how the knee is feeling so far. Patient states 2/10 pain. PE:  AAOx3  RR  Unlabored breathing  Skin warm and moist  Focused physical examination of the left knee  Nontender to palpation medial femoral condyle with the knee at 90 degrees. No effusion. Remainder of neurovascular exam unchanged. Pertinent radiographs/imagin view left knee 2022: Negative fracture. No gross subchondral collapse medial femoral condyle. Slight sclerosis appreciated there. Vitamin D level 22: 37.6     Diagnosis Orders   1. Insufficiency fracture of medial femoral condyle (HCC)  XR KNEE LEFT (1-2 VIEWS)     Assessment and plan: 46 male with continued subjective symptoms of left knee pain with known, correlating diagnosis of insufficiency fracture medial femoral condyle. -Time of 16 minutes was spent coordinating and discussing the clinical findings and diagnostic imaging results as they pertain to the patient's presenting subjective symptoms.  -I had a pleasant discussion with the patient today. Additional time was taken today to review with him images from today as well as in comparison to previous radiographs and also his vitamin D level. He is not vitamin D deficient or insufficient. This is encouraging. I reviewed with him that currently his clinical examination is well-appearing. He is pleased with the results with having gone to physical therapy and is continue to make strides. At this time we may start allowing him to weight-bear as tolerated using crutches.   He was educated he can wean off crutches by 2 crutch to 1 crutch and then 1 crutch to no crutch over the next several weeks. This can be done under the guidance of physical therapy as they continue to recondition the core hamstring and quads  -A work note was provided for him to return to work as tolerated starting on 7/18/2022. -OTC Tylenol per bottle as needed discomfort.  -Continue activity modification as he attempts to return to walking without crutch use. I advocated for elliptical stationary bike swimming and walking and overall weight maintenance as his BMI is over 40.  -At this time, symptomatically he is doing better and his clinical examination is well-appearing. No additional advocated advanced imaging at this time.  -Should he have increase in pain with attempts to weight-bear despite protected weightbearing progressions and wean off crutches, and consideration for additional MRI at that time given previous MRI although [facility] 3 months ago and referral to provider for consideration of bio plasty versus cell chondroplasty  -All questions answered to the patient's satisfaction and the patient expressed understanding and agreement with the above listed treatment plan  -Follow up in 6 to 8 weeks as needed per the above  -Thank you for the clinical consultation and allowing me to participate in the patient's care. Electronically signed by Norris Chilel MD on 7/5/22 at 5:53 PM EDT         Norris Chilel MD       Orthopaedic Surgery-Sports Medicine    Disclaimer: This note was dictated with voice recognition software. Though review and correction are routinely performed, please contact the office/medical records for any errors requiring correction.

## 2022-07-05 NOTE — LETTER
[address] [place] [address] [address] Song [address] [facility] [address]  Phone: [phone]  Fax: [phone]    Red Duckworth MD        July 5, 2022     Patient: Chiara Rendon   YOB: 1970   Date of Visit: 7/5/2022       To Whom It May Concern: It is my medical opinion that Jose Vaca may return to work on July 19th as tolerated. If you have any questions or concerns, please don't hesitate to call.     Sincerely,           Red Duckworth MD       Orthopaedic Surgery-Sports Medicine      Red Duckworth MD

## 2022-07-05 NOTE — TELEPHONE ENCOUNTER
General Question     Subject: PATIENT IS WANTING A RETURN TO WORK NOT SENT VIA EMAIL. PLEASE ADVISE. [email or web address]  Patient Sisi Otero  Contact Number: [phone]

## 2022-07-05 NOTE — LETTER
[address] [place] [address] [address] [place] [address]  Phone: [phone]  Fax: [phone]    Osiris Barreto MD        July 5, 2022     Patient: Ivy Red   YOB: 1970   Date of Visit: 7/5/2022       To Whom It May Concern: It is my medical opinion that Seb Alvarenga may return to work on July 18th as tolerated. If you have any questions or concerns, please don't hesitate to call.     Sincerely,           Osiris Barreto MD       Orthopaedic Surgery-Sports Medicine      Osiris Barreto MD

## 2022-07-05 NOTE — TELEPHONE ENCOUNTER
General Question     Subject: PATIENT LOST HIS WORK NOTE AND IS WONDERING IF IT CAN BE EMAILED TO [email or web address]. PLEASE ADVISE.   Patient: Rivka Bennett  Contact Number: [phone]

## 2022-07-06 ENCOUNTER — HOSPITAL ENCOUNTER (OUTPATIENT)
Dept: PHYSICAL THERAPY | Age: 52
Setting detail: THERAPIES SERIES
Discharge: HOME | End: 2022-07-06

## 2022-07-06 PROCEDURE — 97016 VASOPNEUMATIC DEVICE THERAPY: CPT | Performed by: PHYSICAL THERAPIST

## 2022-07-06 PROCEDURE — 97112 NEUROMUSCULAR REEDUCATION: CPT | Performed by: PHYSICAL THERAPIST

## 2022-07-06 PROCEDURE — 97110 THERAPEUTIC EXERCISES: CPT | Performed by: PHYSICAL THERAPIST

## 2022-07-06 PROCEDURE — 97530 THERAPEUTIC ACTIVITIES: CPT | Performed by: PHYSICAL THERAPIST

## 2022-07-06 NOTE — FLOWSHEET NOTE
[facility], [address] [place]  Phone: [phone]  Fax [phone]    Physical Therapy Treatment Note/ Progress Report:           Date:  2022    Patient Name:  Peterson Zarco    :  1970  MRN: [id number]  Restrictions/Precautions:    Medical/Treatment Diagnosis Information:  Diagnosis: M17.12 (ICD-10-CM) - Localized osteoarthritis of left [id number] (ICD-10-CM) - Complex tear of medial meniscus of left knee as current injury, initial [id number] (ICD-10-CM) - Insufficiency fracture of medial femoral condyle (HCC)  Treatment Diagnosis: L knee pain Q24.594  Insurance/Certification information:  PT Insurance Information: eleonorae: 100% BMN auth required  Physician Information:   Dr. Shaheed Ravi   Has the plan of care been signed (Y/N):        []  Yes  [x]  No     Date of Patient follow up with Physician: none scheduled       Is this a Progress Report:     []  Yes  [x]  No        If Yes:  Date Range for reporting period:  Beginning 22  Ending     Progress report will be due (10 Rx or 30 days whichever is less):        Recertification will be due (POC Duration  / 90 days whichever is less): 8/3/22      Visit # Insurance Allowable Auth Required   In-person  8 visits -22 [x]  Yes []  No    Telehealth   []  Yes []  No    Total          Functional Scale: FOTO 63/100   Date assessed:  22      Therapy Diagnosis/Practice Pattern: 4E      Number of Comorbidities:  []0     []1-2    [x]3+    Latex Allergy:  [x]NO      []YES  Preferred Language for Healthcare:   [x]English       []other:      Pain level: 1/10 L knee     SUBJECTIVE:  Pt reports he saw MD and was told he cold wean off crutches over the next week. He has been increasing his WBing without increased pain.  Pt reports he got the ok to go back to work on the      OBJECTIVE:    Observation:    Test measurements: RESTRICTIONS/PRECAUTIONS: A-fib, HTN, DM II ; progress WBing as tolerated     Exercises/Interventions:     Therapeutic Ex (25296)/ NMR re-education (91553) Sets/sec Notes/CUES   Hamstring stretch in cage  15\" x5     Standing gastroc stretch with incline board  5\" x10       Standing HR at 1/2 wall  2x15 on airex     Supine QS with SLR with ER   2 x10  1#  Hep    Prone quad stretching 10\"x10    SAQ with add squeeze 3 sec hold 5# 2x15    Standing weight shifts on airex  1 min ea     Standing ham curls with TB dead lift       Standing hip ext and abd  with TB Red versa 2x10  Ea B    Hip machine TKE     Hip machine abd      Bike 12 min     Supine bridge with SB knees to chest         2x10     SL hip abd  2\" 3x10 2#  +hep    Manual Intervention (47576)     passive knee ext stretches; hamstring, ITB quad stretching                               Therapeutic Activity (39873)     Gait training with 1 crutch  5 min     Stepping over cones at 1/2 wall  5 min                               Therapeutic Exercise and NMR EXR  [x] (26174) Provided verbal/tactile cueing for activities related to strengthening, flexibility, endurance, ROM for improvements in LE, proximal hip, and core control with self care, mobility, lifting, ambulation.  [] (14766) Provided verbal/tactile cueing for activities related to improving balance, coordination, kinesthetic sense, posture, motor skill, proprioception  to assist with LE, proximal hip, and core control in self care, mobility, lifting, ambulation and eccentric single leg control.      NMR and Therapeutic Activities:    [x] (05122 or 48008) Provided verbal/tactile cueing for activities related to improving balance, coordination, kinesthetic sense, posture, motor skill, proprioception and motor activation to allow for proper function of core, proximal hip and LE with self care and ADLs  [x] (98230) Gait Re-education- Provided training and instruction to the patient for proper LE, core and proximal hip recruitment and positioning and eccentric body weight control with ambulation re-education including up and down stairs     Home Exercise Program:    [x] (19720) Reviewed/Progressed HEP activities related to strengthening, flexibility, endurance, ROM of core, proximal hip and LE for functional self-care, mobility, lifting and ambulation/stair navigation   [] (29975)Reviewed/Progressed HEP activities related to improving balance, coordination, kinesthetic sense, posture, motor skill, proprioception of core, proximal hip and LE for self care, mobility, lifting, and ambulation/stair navigation      Manual Treatments:  PROM / STM / Oscillations-Mobs:  G-I, II, III, IV (PA's, Inf., Post.)  [x] (71551) Provided manual therapy to mobilize LE, proximal hip and/or LS spine soft tissue/joints for the purpose of modulating pain, promoting relaxation,  increasing ROM, reducing/eliminating soft tissue swelling/inflammation/restriction, improving soft tissue extensibility and allowing for proper ROM for normal function with self care, mobility, lifting and ambulation. Modalities:     [x] GAME READY (VASO)- for significant edema, swelling, pain control. Charges  Timed Code Treatment Minutes: 45   Total Treatment Minutes: 60     [] EVAL (LOW) 67384   [] EVAL (MOD) 89443  [] EVAL (HIGH) 86764   [] RE-EVAL     [x] BV(40941) x1     [] IONTO  [x] NMR (00951) x 1    [x] VASO x1   Manual (84450) x      [] Other:  [x] TA x1      [] Mech Traction (40528)  [] ES(attended) (66783)      [] ES (un) (16438):       GOALS:  Patient stated goal: be able to walk 1-2 miles  [] Progressing: [] Met: [] Not Met: [] Adjusted    Therapist goals for Patient:   Short Term Goals: To be achieved in: 2 weeks  1. Independent in HEP and progression per patient tolerance, in order to prevent re-injury. [] Progressing: [x] Met: [] Not Met: [] Adjusted  2.  Patient will have a decrease in pain to facilitate improvement in movement, function, and ADLs as indicated by Functional Deficits. [] Progressing: [x] Met: [] Not Met: [] Adjusted    Long Term Goals: To be achieved in: 10 weeks  1. Disability index score of 72% or more per FOTO to assist with reaching prior level of function. [] Progressing: [] Met: [x] Not Met: [] Adjusted  2. Patient will demonstrate increased AROM to L knee flex to 130 and ext to -3 to allow for proper joint functioning as indicated by patients Functional Deficits. [] Progressing: [x] Met: [] Not Met: [] Adjusted  3. Patient will demonstrate an increase in Strength to good proximal hip strength and control, within 5lb HHD in LE to allow for proper functional mobility as indicated by patients Functional Deficits. [x] Progressing: [] Met: [] Not Met: [] Adjusted  4. Patient will return to being able to ambulate without an AD with proper gait, ascend and descend stairs with reciprocal gait in home  without increased symptoms or restriction. [x] Progressing: [] Met: [] Not Met: [] Adjusted  5. Patient able to return to work as a     [] Progressing: [] Met: [x] Not Met: [] Adjusted    Progression Towards Functional goals:  [x] Patient is progressing as expected towards functional goals listed. [] Progression is slowed due to complexities listed. [] Progression has been slowed due to co-morbidities. [] Plan just implemented, too soon to assess goals progression  [] Other:         Overall Progression Towards Functional goals/ Treatment Progress Update:  [x] Patient is progressing as expected towards functional goals listed. [] Progression is slowed due to complexities/Impairments listed. [] Progression has been slowed due to co-morbidities.   [] Plan just implemented, too soon to assess goals progression <30days   [] Goals require adjustment due to lack of progress  [] Patient is not progressing as expected and requires additional follow up with physician  [] Other    Prognosis for POC: [x] Good [] Fair  [] Poor      Patient requires continued skilled intervention: [x] Yes  [] No    Treatment/Activity Tolerance:  [x] Patient able to complete treatment  [] Patient limited by fatigue  [] Patient limited by pain    [] Patient limited by other medical complications  [] Other:     ASSESSMENT:  Pt needs lots of cuing for proper gait to progress from 2 crutches to 1 crutch. No c/o increased L knee pain with increased WBing and standing CKC strengthening exercises                    Access Code: [email or web address]  URL: [email or web address]  Date: 05/25/2022  Prepared by: Jake Phan    Exercises  Seated Table Hamstring Stretch - 2 x daily - 7 x weekly - 1 sets - 3 reps - 30 hold  Long Sitting Calf Stretch with Strap - 2 x daily - 7 x weekly - 3 sets - 3 reps - 30 hold  Long Sitting Quad Set - 2 x daily - 7 x weekly - 1 sets - 10 reps - 10 hold  Supine Active Straight Leg Raise - 2 x daily - 7 x weekly - 2 sets - 10 reps - 2 hold      Access Code: [email or web address]  URL: [email or web address]  Date: 06/15/2022  Prepared by: Jake Phan    Exercises  Sidelying Pelvic Floor Contraction with Hip Abduction - 2 x daily - 7 x weekly - 3 sets - 10 reps - 2 hold    PLAN: Progress WBing to full as pt tolerates. Progress CKC strengthening exercises. Check for add'l approval for visits   [x] Continue per plan of care [] Alter current plan (see comments above)  [] Plan of care initiated [] Hold pending MD visit [] Discharge      Electronically signed by:  Jake Phan, [id number]     Note: If patient does not return for scheduled/ recommended follow up visits, this note will serve as a discharge from care along with most recent update on progress.

## 2022-07-13 ENCOUNTER — HOSPITAL ENCOUNTER (OUTPATIENT)
Dept: PHYSICAL THERAPY | Age: 52
Setting detail: THERAPIES SERIES
Discharge: HOME | End: 2022-07-13

## 2022-07-13 NOTE — FLOWSHEET NOTE
[facility], [address]        Physical Therapy  Cancellation/No-show Note  Patient Name:  Mark Real  :  1970   Date:  2022  Cancelled visits to date: 1 initial eval  No-shows to date: 2    For today's appointment patient:  []  Cancelled  []  Rescheduled appointment  [x]  No-show     Reason given by patient:  []  Patient ill  []  Conflicting appointment  []  No transportation    []  Conflict with work  []  No reason given  [x]  Other: called pt and pt reports he did not think he had approval for more visits and thought he had cancelled his appt. Informed pt that he is approved for 4 more visits through his insurance.  Pt has an appt next week    Comments:      Electronically signed by:  Amalia Martinez PT,

## 2022-07-20 ENCOUNTER — HOSPITAL ENCOUNTER (OUTPATIENT)
Dept: PHYSICAL THERAPY | Age: 52
Setting detail: THERAPIES SERIES
Discharge: HOME | End: 2022-07-20

## 2022-07-20 NOTE — FLOWSHEET NOTE
[facility],  [address]        Physical Therapy  Cancellation/No-show Note  Patient Name:  Jose Lara  :  1970   Date:  2022  Cancelled visits to date: 1 initial eval  No-shows to date: 3    For today's appointment patient:  []  Cancelled  []  Rescheduled appointment  [x]  No-show     Reason given by patient:  []  Patient ill  []  Conflicting appointment  []  No transportation    []  Conflict with work  [x]  No reason given  []  Other:   Comments:      Electronically signed by:  Israel Brown PT,

## 2022-07-25 ENCOUNTER — HOSPITAL ENCOUNTER (OUTPATIENT)
Dept: PHYSICAL THERAPY | Age: 52
Setting detail: THERAPIES SERIES
Discharge: HOME | End: 2022-07-25

## 2022-07-25 NOTE — FLOWSHEET NOTE
[facility],  [address]        Physical Therapy  Cancellation/No-show Note  Patient Name:  Will Nicholas  :  1970   Date:  2022  Cancelled visits to date: 1 initial eval  No-shows to date: 3    For today's appointment patient:  []  Cancelled  []  Rescheduled appointment  [x]  No-show     Reason given by patient:  []  Patient ill  []  Conflicting appointment  []  No transportation    []  Conflict with work  [x]  No reason given  []  Other:   Comments:      Electronically signed by:  Tesfaye Velasco PT,

## 2023-01-09 ENCOUNTER — OFFICE VISIT (OUTPATIENT)
Dept: CARDIOLOGY CLINIC | Age: 53
End: 2023-01-09
Payer: COMMERCIAL

## 2023-01-09 VITALS
BODY MASS INDEX: 42.42 KG/M2 | WEIGHT: 303 LBS | HEART RATE: 87 BPM | SYSTOLIC BLOOD PRESSURE: 168 MMHG | HEIGHT: 71 IN | OXYGEN SATURATION: 98 % | DIASTOLIC BLOOD PRESSURE: 90 MMHG

## 2023-01-09 DIAGNOSIS — R06.83 SNORING: ICD-10-CM

## 2023-01-09 DIAGNOSIS — E78.2 MIXED HYPERLIPIDEMIA: ICD-10-CM

## 2023-01-09 DIAGNOSIS — I48.91 ATRIAL FIBRILLATION, UNSPECIFIED TYPE (HCC): Primary | ICD-10-CM

## 2023-01-09 DIAGNOSIS — I10 PRIMARY HYPERTENSION: ICD-10-CM

## 2023-01-09 DIAGNOSIS — E11.9 TYPE 2 DIABETES MELLITUS WITHOUT COMPLICATION, WITHOUT LONG-TERM CURRENT USE OF INSULIN (HCC): ICD-10-CM

## 2023-01-09 PROCEDURE — 93000 ELECTROCARDIOGRAM COMPLETE: CPT | Performed by: INTERNAL MEDICINE

## 2023-01-09 PROCEDURE — 3077F SYST BP >= 140 MM HG: CPT | Performed by: INTERNAL MEDICINE

## 2023-01-09 PROCEDURE — 99204 OFFICE O/P NEW MOD 45 MIN: CPT | Performed by: INTERNAL MEDICINE

## 2023-01-09 PROCEDURE — 3080F DIAST BP >= 90 MM HG: CPT | Performed by: INTERNAL MEDICINE

## 2023-01-09 RX ORDER — HYDROCHLOROTHIAZIDE 12.5 MG/1
12.5 CAPSULE, GELATIN COATED ORAL EVERY MORNING
Qty: 90 CAPSULE | Refills: 4 | Status: SHIPPED | OUTPATIENT
Start: 2023-01-09

## 2023-01-09 RX ORDER — LISINOPRIL 20 MG/1
20 TABLET ORAL DAILY
Qty: 90 TABLET | Refills: 4 | Status: SHIPPED | OUTPATIENT
Start: 2023-01-09

## 2023-01-09 RX ORDER — DILTIAZEM HYDROCHLORIDE 240 MG/1
240 CAPSULE, EXTENDED RELEASE ORAL EVERY EVENING
Qty: 90 CAPSULE | Refills: 4 | Status: SHIPPED | OUTPATIENT
Start: 2023-01-09

## 2023-01-09 NOTE — PROGRESS NOTES
[facility]  Cardiac Consult     Referring Provider:  On File Not (Inactive)     Chief Complaint   Patient presents with    New Patient    Hypertension    Hyperlipidemia        History of Present Illness:  46 y.o. male seen as a new patient for afib, HTN and DOT clearance. He was found to have afib 10/2021 during a DOT evaluation. He was admitted in [facility] and underwent ALLEN cardioversion. He was placed on eliquis. He did not f/u with cardiology as it was in [facility] and has been followed by her PCP. No obvious recurrence of afib. He has chronic mild dyspnea with exertion without associated chest pain. Resolves with rest. BP has been high with GQX=[phone]. Due to this lisinopril was increased from 10=>20 mg by his PCP. He no longer has a PCP as she left the practice. He now needs DOT clearance for his job. He does snore and has never been evaluated for sleep apnea. Past Medical History:   has a past medical history of Diabetes mellitus ([address] Hyperlipidemia, and Hypertension. Surgical History:   has no past surgical history on file. Social History:  Social History     Tobacco Use    Smoking status: Never    Smokeless tobacco: Never   Substance Use Topics    Alcohol use: Yes     Comment: gelacio        Family History:  family history includes Heart Attack in his mother. Allergies:  Patient has no known allergies. Home Medications:  Prior to Visit Medications    Medication Sig Taking?  Authorizing Provider   apixaban (ELIQUIS) 5 MG TABS tablet Take 5 mg by mouth 2 times daily Yes Historical Provider, MD   atorvastatin (LIPITOR) 20 MG tablet Take 20 mg by mouth daily Yes Historical Provider, MD   lisinopril (PRINIVIL;ZESTRIL) 10 MG tablet Take 10 mg by mouth daily Yes Historical Provider, MD   metFORMIN (GLUCOPHAGE) 500 MG tablet Take 500 mg by mouth daily Yes Historical Provider, MD   dilTIAZem (TIAZAC) 120 MG extended release capsule Take 1 capsule by mouth Yes Historical Provider, MD       [x] Medications and dosages reviewed. ROS:  [x]Full ROS obtained and negative except as mentioned in HPI      PHYSICAL EXAMINATION:  Vitals:    01/09/23 1432   BP: (!) 170/100   Site: Left Upper Arm   Position: Sitting   Cuff Size: Large Adult   Pulse: 87   SpO2: 98%   Weight: (!) 303 lb (137.4 kg)   Height: 5' 11\" (1.803 m)        GENERAL: Well developed, well nourished, No acute distress  NEUROLOGICAL: Alert and oriented  PSYCH: Calm affect  SKIN: Warm and dry, No visible rash,   EYES: Pupils equal and round, Sclera non-icteric,   HENT:  External ears and nose unremarkable, mucus membranes moist  MUSCULOSKELETAL: Normal cephalic, neck supple  CAROTID: Normal upstroke, no bruits  CARDIAC: JVP normal, Normal PMI, regular rate and rhythm, normal S1S2, no murmur, rub, or gallop  RESPIRATORY: Normal respiratory effort, clear to auscultation bilaterally  EXTREMITIES: No edema  GASTROINTESTINAL: normal bowel sounds, soft, non-tender, No hepatomegaly     ALLEN 10/2021 [facility]      Left Ventricle: Left ventricle cavity size is normal. Wall thickness is   normal. Systolic function is low normal with an ejection fraction of 50%. There are no regional LV wall motion abnormalities. Right Ventricle: Right ventricle cavity appears normal. Systolic   function is normal.     Left Atrium: Left atrium cavity is mildly dilated. There is no thrombus   in the left atrial appendage. There is no mass in the left atrial   appendage. Saline contrast study is negative without provocation. No significant valvular abnormalities     Successful synchronized cardioversion to sinus rhythm with a 200 J   shock.      EKG:  NSR, Normal      ASSESSMENT:    HTN:  Poorly controlled  BP (!) 168/90 (Site: Left Upper Arm, Position: Sitting, Cuff Size: Large Adult)   Pulse 87   Ht 5' 11\" (1.803 m)   Wt (!) 303 lb (137.4 kg)   SpO2 98%   BMI 42.26 kg/m²   Increase dilt to 240 mg, Add HCTZ 12.5    Dyspnea with Exertion:  Plan stress ECHO      Afib:  CHADS2-Vasc=2 (HTN, AODM)  Continue anticoagulation with eliquis  Currently in sinus    Snoring:  Suspect sleep apnea  Plan sleep evaluation    Hyperlipidemia:  Controlled  LDL=58  Continue lipitor    DOT:  Needs better BP control and stress ECHO      PLAN:  Increase dilt to 240  HCTZ 12.5  Sleep eval  Stress ECHO  F/u 3 weeks    Thank you for allowing me to participate in the care of this individual.      Inocente Hurtado M.D., [facility]

## 2023-01-09 NOTE — PATIENT INSTRUCTIONS
Increase Diltiazem to 240 mg daily  Start Hydrochlorothiazide 12.5 mg daily  Follow BP at home    [address] [place], MD  [address] [place] [address], [address] [facility],[address]  Sofisara Iam, [address]  Phone: [phone]    Stress echo soon  Follow up in early Feb    Okay to start light to moderate exercise until stress test done (if stress test okay then can go harder with exercise)

## 2023-01-13 DIAGNOSIS — I48.0 PAROXYSMAL ATRIAL FIBRILLATION (HCC): ICD-10-CM

## 2023-01-13 DIAGNOSIS — R06.09 DOE (DYSPNEA ON EXERTION): Primary | ICD-10-CM

## 2023-01-13 DIAGNOSIS — I10 PRIMARY HYPERTENSION: ICD-10-CM

## 2023-01-16 ENCOUNTER — PROCEDURE VISIT (OUTPATIENT)
Dept: CARDIOLOGY CLINIC | Age: 53
End: 2023-01-16
Payer: COMMERCIAL

## 2023-01-16 DIAGNOSIS — I10 PRIMARY HYPERTENSION: ICD-10-CM

## 2023-01-16 DIAGNOSIS — I48.0 PAROXYSMAL ATRIAL FIBRILLATION (HCC): ICD-10-CM

## 2023-01-16 DIAGNOSIS — R06.09 DOE (DYSPNEA ON EXERTION): ICD-10-CM

## 2023-01-16 LAB
LV EF: 50 %
LVEF MODALITY: NORMAL

## 2023-01-16 PROCEDURE — 93320 DOPPLER ECHO COMPLETE: CPT | Performed by: INTERNAL MEDICINE

## 2023-01-16 PROCEDURE — 93351 STRESS TTE COMPLETE: CPT | Performed by: INTERNAL MEDICINE

## 2023-01-16 PROCEDURE — 93325 DOPPLER ECHO COLOR FLOW MAPG: CPT | Performed by: INTERNAL MEDICINE

## 2023-01-17 ENCOUNTER — TELEPHONE (OUTPATIENT)
Dept: CARDIOLOGY CLINIC | Age: 53
End: 2023-01-17

## 2023-01-17 NOTE — TELEPHONE ENCOUNTER
Pt called to ask [facility] what is the reason why [facility] will not sign his paperwork. Pt is wanting help with his EKG. Please call to discuss.   Thank you

## 2023-01-17 NOTE — TELEPHONE ENCOUNTER
Called patient to let him know MMK will not sign paperwork until seen in office. Voiced understanding.

## 2023-01-17 NOTE — TELEPHONE ENCOUNTER
Called and spoke to Karen. 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  Pt has appt to see him 1/20 at 0800

## 2023-01-17 NOTE — TELEPHONE ENCOUNTER
Pt called asking for his DOT Clearance signoff. Pt would like to get this today if possible. Please advise pt of status of paperwork.

## 2023-01-30 ENCOUNTER — HOSPITAL ENCOUNTER (OUTPATIENT)
Dept: CT IMAGING | Age: 53
Discharge: HOME | End: 2023-01-30
Payer: COMMERCIAL

## 2023-01-30 ENCOUNTER — TELEPHONE (OUTPATIENT)
Dept: CARDIOLOGY CLINIC | Age: 53
End: 2023-01-30

## 2023-01-30 VITALS — DIASTOLIC BLOOD PRESSURE: 64 MMHG | HEART RATE: 54 BPM | SYSTOLIC BLOOD PRESSURE: 126 MMHG

## 2023-01-30 DIAGNOSIS — R93.1 EQUIVOCAL STRESS ECHOCARDIOGRAM: ICD-10-CM

## 2023-01-30 DIAGNOSIS — R93.1 ABNORMAL COMPUTED TOMOGRAPHY ANGIOGRAPHY OF HEART: Primary | ICD-10-CM

## 2023-01-30 DIAGNOSIS — R06.09 DOE (DYSPNEA ON EXERTION): ICD-10-CM

## 2023-01-30 PROCEDURE — 6370000000 HC RX 637 (ALT 250 FOR IP): Performed by: RADIOLOGY

## 2023-01-30 PROCEDURE — 6360000004 HC RX CONTRAST MEDICATION: Performed by: INTERNAL MEDICINE

## 2023-01-30 PROCEDURE — 75574 CT ANGIO HRT W/3D IMAGE: CPT

## 2023-01-30 RX ORDER — NITROGLYCERIN 0.4 MG/1
0.4 TABLET SUBLINGUAL ONCE
Status: COMPLETED | OUTPATIENT
Start: 2023-01-30 | End: 2023-01-30

## 2023-01-30 RX ADMIN — IOPAMIDOL 80 ML: 755 INJECTION, SOLUTION INTRAVENOUS at 15:22

## 2023-01-30 RX ADMIN — NITROGLYCERIN 0.4 MG: 0.4 TABLET SUBLINGUAL at 14:55

## 2023-01-30 NOTE — PROGRESS NOTES
Pt here for Cardiac CTA test.  Administered no metoprolol for exam. Pts heart rate consistently in 50 BPM  range. Administered 0.4mg nitroglycerin sublingual for exam.  Pt tolerated well. VSS. See flow sheet. Pt discharged to home .

## 2023-01-30 NOTE — TELEPHONE ENCOUNTER
Your Child's Health  Two-Month-Old Visit                                                                    Audrey Caruso  May 1, 2018    Visit Vitals  Temp 98.2 °F (36.8 °C) (Axillary)   Ht 21.3\" (54.1 cm)   Wt 4.706 kg   HC 37.5 cm (14.76\")   BMI 16.08 kg/m²     Weight: 10.37 lbs    NUTRITION: Mother's milk or formula provides all the nutrition that babies need at this age. Because we have less sunlight in our part of the country, infants whose only source of nutrition is mother's milk should have nursing baby vitamins with Vitamin D or Vitamin A, D and C  (available without prescription at the drug store) each day.  Formula fed babies who are not on a  formula should also take vitamins until they are drinking 32 oz. of formula a day.    BABYSITTERS: As much as you love Audrey, you will occasionally need a break.  Be sure to leave the following information with your :    1.   Your name, home address, and telephone number.  2.   Where you will be and a phone number where you can be reached.  3.   Name and telephone number of person to call if you cannot be reached.  4.   Police telephone number (or 911 if available in your area).  5.   Fire Department number (or 911 if available in your area).    6.   Poison Control number (0-528-805-4054)    DEVELOPMENT:  Most babies at this age will begin to make experimental noises, smile, stare at their fists, and gain control over their head movements.    ACCIDENT PREVENTION:  1.   Falls:  Many babies begin to roll over between three and six months of age.  Be careful not to leave Audrey alone where she might roll over and fall.  2.   Scalding:  Adjust your hot-water heater to 120-130 degrees F, or use the low setting.  Most water heaters are set at 140 degrees, which will burn a child's skin within four seconds.  You will also save a lot of money on your gas or electric bill by turning your water heater down.   3.   Walkers:  Please do not purchase or use  Patient's cardiac CTA :  1. Extensive atherosclerotic plaque in the proximal mid and distal LAD with long segment of moderate stenosis estimated at 50-70% in the mid LAD and severe stenosis estimated at 70-90% in the distal LAD just past the second diagonal coronary artery. 2. Codominant coronary arteries. 3. Soft plaque in the proximal and mid right coronary artery with minimal stenosis less than 25%. 4. Widely patent circumflex coronary artery and obtuse marginal.           Dr William Moses spoke to patient over the phone about results. Needs cardiac catheterization. Patient will need to hold Eliquis 48 hrs prior to procedure. walkers.  They are the cause of many accidents and have no developmental advantages.  Stationary \"saucers\" and jumpers provide the advantages without the dangers.  4.   Toys:  Choose toys that have been approved for Aria's age and that encourage the development of appropriate motor skills.    CAR SAFETY:  An approved rear facing car seat in the back seat of the car is required by [place] law until Aria is two years old.  When you buy a car seat, please check for safety problems for your particular model by calling the [employer] Safety Hotline at 1-331.764.8211.  To find a certified car seat  who can determine if your car seat is installed properly, call 6-463-SEAT CHECK.    Take advantage of one of the free programs provided by the police, hospitals, fire departments, and car dealers to make sure that your seat is properly installed.    SUN EXPOSURE: If you plan to have Aria outside for more than 30 minutes, please use sunscreen.    SMOKING: Children exposed to tobacco smoke have more ear infections and pneumonia. The risk of Sudden Infant Death Syndrome (SIDS) is also increased.  If you smoke, please quit.  If you cannot quit, smoke outside.  Do not smoke near your baby, and do not let others smoke near your baby. Do not smoke in the car.     MEDICATION FOR FEVER OR PAIN:   Acetaminophen liquid (e.g., Tylenol or Tempra) may be given every four hours as needed for pain or fever.      INFANT/CHILDREN’S Tylenol/Acetaminophen  (160 MG/5 mL)    Child’s Weight: Dose:  06 - 11 pounds:   40 mg (1.25 mL  (1/4 Teaspoon)  12 - 17 pounds:   80 mg (2.5 mL  (1/2 Teaspoon)    If Aria is outside these weight ranges, call your pediatrician's office for advice.    Most Recent Immunizations   Administered Date(s) Administered   • Hep B, adolescent or pediatric 2018   Pended Date(s) Pended   • DTaP/Hep B/IPV 2018   • Hib (PRP-OMP) 2018   • Pneumococcal Conjugate 13 valent 2018   • Rotavirus - monovalent  2018       If Aria develops any of the following reactions within 72 hours after an immunization, notify your pediatrician by calling the pediatric phone nurse:  1.   A temperature of 105 degrees or above.  2.   More than 3 hours of continuous crying.  3.   A shrill, high-pitched cry.  4.   A seizure or a fainting spell.  In this case you should call 911 or proceed to the emergency room.      NEXT VISIT:  FOUR MONTHS OF AGE    Thank you for entrusting your care to [facility].

## 2023-01-31 NOTE — TELEPHONE ENCOUNTER
Spoke with Pt, he is scheduled on 2-8-23/6:45-8:00am with [facility]. I reviewed all preps with patient voicing understanding. He would like to speak with RN to go over in more detail what all will happen in the procedure.

## 2023-02-06 ENCOUNTER — TELEPHONE (OUTPATIENT)
Dept: CARDIOLOGY CLINIC | Age: 53
End: 2023-02-06

## 2023-02-08 ENCOUNTER — HOSPITAL ENCOUNTER (OUTPATIENT)
Dept: CARDIAC CATH/INVASIVE PROCEDURES | Age: 53
Discharge: HOME | End: 2023-02-08
Attending: INTERNAL MEDICINE | Admitting: INTERNAL MEDICINE
Payer: COMMERCIAL

## 2023-02-08 VITALS
WEIGHT: 303 LBS | TEMPERATURE: 98 F | DIASTOLIC BLOOD PRESSURE: 88 MMHG | SYSTOLIC BLOOD PRESSURE: 148 MMHG | HEIGHT: 71 IN | HEART RATE: 62 BPM | BODY MASS INDEX: 42.42 KG/M2 | OXYGEN SATURATION: 97 % | RESPIRATION RATE: 18 BRPM

## 2023-02-08 DIAGNOSIS — I25.10 CORONARY ARTERY DISEASE DUE TO LIPID RICH PLAQUE: Primary | ICD-10-CM

## 2023-02-08 DIAGNOSIS — R93.1 ABNORMAL COMPUTED TOMOGRAPHY ANGIOGRAPHY OF HEART: ICD-10-CM

## 2023-02-08 DIAGNOSIS — I25.83 CORONARY ARTERY DISEASE DUE TO LIPID RICH PLAQUE: Primary | ICD-10-CM

## 2023-02-08 LAB
ANION GAP SERPL CALCULATED.3IONS-SCNC: 13 MMOL/L (ref 3–16)
BUN BLDV-MCNC: 17 MG/DL (ref 7–20)
CALCIUM SERPL-MCNC: 9.8 MG/DL (ref 8.3–10.6)
CHLORIDE BLD-SCNC: 102 MMOL/L (ref 99–110)
CO2: 23 MMOL/L (ref 21–32)
CREAT SERPL-MCNC: 1 MG/DL (ref 0.9–1.3)
EKG ATRIAL RATE: 62 BPM
EKG DIAGNOSIS: NORMAL
EKG P AXIS: 12 DEGREES
EKG P-R INTERVAL: 160 MS
EKG Q-T INTERVAL: 434 MS
EKG QRS DURATION: 104 MS
EKG QTC CALCULATION (BAZETT): 440 MS
EKG R AXIS: -1 DEGREES
EKG T AXIS: 41 DEGREES
EKG VENTRICULAR RATE: 62 BPM
GFR SERPL CREATININE-BSD FRML MDRD: >60 ML/MIN/{1.73_M2}
GLUCOSE BLD-MCNC: 154 MG/DL (ref 70–99)
HCT VFR BLD CALC: 44.1 % (ref 40.5–52.5)
HEMOGLOBIN: 14.5 G/DL (ref 13.5–17.5)
LEFT VENTRICULAR EJECTION FRACTION MODE: NORMAL
LV EF: 60 %
MCH RBC QN AUTO: 29.6 PG (ref 26–34)
MCHC RBC AUTO-ENTMCNC: 32.9 G/DL (ref 31–36)
MCV RBC AUTO: 89.9 FL (ref 80–100)
PDW BLD-RTO: 13.7 % (ref 12.4–15.4)
PLATELET # BLD: 192 K/UL (ref 135–450)
PMV BLD AUTO: 7.7 FL (ref 5–10.5)
POC ACT LR: 241 SEC
POC ACT LR: 302 SEC
POTASSIUM SERPL-SCNC: 3.7 MMOL/L (ref 3.5–5.1)
RBC # BLD: 4.9 M/UL (ref 4.2–5.9)
SODIUM BLD-SCNC: 138 MMOL/L (ref 136–145)
WBC # BLD: 6 K/UL (ref 4–11)

## 2023-02-08 PROCEDURE — 93458 L HRT ARTERY/VENTRICLE ANGIO: CPT

## 2023-02-08 PROCEDURE — 6360000002 HC RX W HCPCS

## 2023-02-08 PROCEDURE — C1887 CATHETER, GUIDING: HCPCS

## 2023-02-08 PROCEDURE — 85027 COMPLETE CBC AUTOMATED: CPT

## 2023-02-08 PROCEDURE — 6360000004 HC RX CONTRAST MEDICATION: Performed by: INTERNAL MEDICINE

## 2023-02-08 PROCEDURE — 36415 COLL VENOUS BLD VENIPUNCTURE: CPT

## 2023-02-08 PROCEDURE — 92928 PRQ TCAT PLMT NTRAC ST 1 LES: CPT

## 2023-02-08 PROCEDURE — C1894 INTRO/SHEATH, NON-LASER: HCPCS

## 2023-02-08 PROCEDURE — 99152 MOD SED SAME PHYS/QHP 5/>YRS: CPT | Performed by: INTERNAL MEDICINE

## 2023-02-08 PROCEDURE — 2500000003 HC RX 250 WO HCPCS

## 2023-02-08 PROCEDURE — 85347 COAGULATION TIME ACTIVATED: CPT

## 2023-02-08 PROCEDURE — C1874 STENT, COATED/COV W/DEL SYS: HCPCS

## 2023-02-08 PROCEDURE — 93005 ELECTROCARDIOGRAM TRACING: CPT | Performed by: INTERNAL MEDICINE

## 2023-02-08 PROCEDURE — C1725 CATH, TRANSLUMIN NON-LASER: HCPCS

## 2023-02-08 PROCEDURE — 2709999900 HC NON-CHARGEABLE SUPPLY

## 2023-02-08 PROCEDURE — 92928 PRQ TCAT PLMT NTRAC ST 1 LES: CPT | Performed by: INTERNAL MEDICINE

## 2023-02-08 PROCEDURE — 99152 MOD SED SAME PHYS/QHP 5/>YRS: CPT

## 2023-02-08 PROCEDURE — 99153 MOD SED SAME PHYS/QHP EA: CPT

## 2023-02-08 PROCEDURE — 93458 L HRT ARTERY/VENTRICLE ANGIO: CPT | Performed by: INTERNAL MEDICINE

## 2023-02-08 PROCEDURE — 6370000000 HC RX 637 (ALT 250 FOR IP)

## 2023-02-08 PROCEDURE — 80048 BASIC METABOLIC PNL TOTAL CA: CPT

## 2023-02-08 PROCEDURE — C1769 GUIDE WIRE: HCPCS

## 2023-02-08 PROCEDURE — 93010 ELECTROCARDIOGRAM REPORT: CPT | Performed by: INTERNAL MEDICINE

## 2023-02-08 RX ORDER — ATORVASTATIN CALCIUM 80 MG/1
80 TABLET, FILM COATED ORAL DAILY
Qty: 90 TABLET | Refills: 3 | Status: SHIPPED | OUTPATIENT
Start: 2023-02-08

## 2023-02-08 RX ORDER — CLOPIDOGREL BISULFATE 75 MG/1
75 TABLET ORAL DAILY
Qty: 90 TABLET | Refills: 3 | Status: SHIPPED | OUTPATIENT
Start: 2023-02-08

## 2023-02-08 RX ORDER — SODIUM CHLORIDE 0.9 % (FLUSH) 0.9 %
5-40 SYRINGE (ML) INJECTION PRN
Status: DISCONTINUED | OUTPATIENT
Start: 2023-02-08 | End: 2023-02-08 | Stop reason: HOSPADM

## 2023-02-08 RX ORDER — ACETAMINOPHEN 325 MG/1
650 TABLET ORAL EVERY 4 HOURS PRN
Status: DISCONTINUED | OUTPATIENT
Start: 2023-02-08 | End: 2023-02-08 | Stop reason: HOSPADM

## 2023-02-08 RX ORDER — ONDANSETRON 2 MG/ML
4 INJECTION INTRAMUSCULAR; INTRAVENOUS EVERY 6 HOURS PRN
Status: DISCONTINUED | OUTPATIENT
Start: 2023-02-08 | End: 2023-02-08 | Stop reason: HOSPADM

## 2023-02-08 RX ORDER — SODIUM CHLORIDE 9 MG/ML
INJECTION, SOLUTION INTRAVENOUS PRN
Status: DISCONTINUED | OUTPATIENT
Start: 2023-02-08 | End: 2023-02-08 | Stop reason: HOSPADM

## 2023-02-08 RX ORDER — CARVEDILOL 12.5 MG/1
12.5 TABLET ORAL 2 TIMES DAILY
Qty: 180 TABLET | Refills: 1 | Status: SHIPPED | OUTPATIENT
Start: 2023-02-08

## 2023-02-08 RX ORDER — SODIUM CHLORIDE 0.9 % (FLUSH) 0.9 %
5-40 SYRINGE (ML) INJECTION EVERY 12 HOURS SCHEDULED
Status: DISCONTINUED | OUTPATIENT
Start: 2023-02-08 | End: 2023-02-08 | Stop reason: HOSPADM

## 2023-02-08 RX ORDER — SODIUM CHLORIDE 9 MG/ML
INJECTION, SOLUTION INTRAVENOUS CONTINUOUS
Status: DISCONTINUED | OUTPATIENT
Start: 2023-02-08 | End: 2023-02-08 | Stop reason: HOSPADM

## 2023-02-08 RX ADMIN — IOPAMIDOL 121 ML: 755 INJECTION, SOLUTION INTRAVENOUS at 09:21

## 2023-02-08 NOTE — DISCHARGE INSTRUCTIONS
LEFT HEART CATHETERIZATION  Care of your puncture site:  Remove bandage 24 hours after the procedure. May shower in 24 hours but do not sit in a bathtub/pool of water for 5 days or until the wound is healed. Inspect the site daily and gently clean using soap and water while standing in the shower. Dry thoroughly and apply a Band-Aid that covers the entire site. Do not apply powder or lotion. Normal Observations:  Soreness or tenderness which may last one week. Mild oozing from the incision site. Possible bruising that could last 2 weeks. Activity:  You may resume driving 24 hours following the procedure. You may resume normal activity in 5 days or after the wound heals. Avoid lifting more than 10 pounds for 5 days or until the wound heals. Avoid strenuous exercise or activity for 1 week. Nutrition:  Regular diet  Drink at least 8 to 10 glasses of decaffeinated, non-alcoholic fluid for the next 24 hours to flush the x-ray dye used for your angiogram out of your body. Call your doctor immediately if your condition worsens, for any other concerns, for a follow-up appointment or if you experience any of the following:  Significant bleeding that does not stop after 10 minutes of applying firm pressure on the puncture site. Increased swelling on the groin or leg. Unusual pain, numbness, or tingling of the groin or down the leg. Any signs of infection such as: redness, yellow drainage at the site, swelling or pain. Other Instructions:  Hold Metformin or Metformin containing drugs for 48 hours after procedure.

## 2023-02-08 NOTE — PRE SEDATION
Brief Pre-Op Note/Sedation Assessment      Neel Young  1970  [id number]  8:03 AM    Planned Procedure: Cardiac Catheterization Procedure  Post Procedure Plan: Return to same level of care  Consent: I have discussed with the patient and/or the patient representative the indication, alternatives, and the possible risks and/or complications of the planned procedure and the anesthesia methods. The patient and/or patient representative appear to understand and agree to proceed. Chief Complaint:   Pre-Op Clearance      Indications for Cath Procedure:  Presentation:  Evaluation for Exercise Clearance  2. Anginal Classification within 2 weeks:  CCS IV - Inability to perform any activity without angina or angina at rest, i.e., severe limitation  3. Angina Symptoms Assessment:  Asymptomatic  4. Heart Failure Class within last 2 weeks:  No symptoms  5. Cardiovascular Instability:  No    Prior Ischemic Workup/Eval:  Pre-Procedural Medications: Yes: ACE/ARB/ARNI, Antiarrhythmic Agent Other, and STATIN  2. Stress Test Completed? Yes:  Stress or Imaging Studies Performed (within ANY time period):   Type:  Cardiac CTA  Results:  Positive: Othersever stenosis distal LAD Extent of Ischemia:  High Risk (>3% annual death or MI)    Does Patient need surgery?   Cath Valve Surgery:  No    Pre-Procedure Medical History:  Vital Signs:  BP (!) 148/88   Pulse 62   Temp 98 °F (36.7 °C)   Resp 18   Ht 5' 11\" (1.803 m)   Wt (!) 303 lb (137.4 kg)   SpO2 97%   BMI 42.26 kg/m²     Allergies:  No Known Allergies  Medications:    Current Facility-Administered Medications   Medication Dose Route Frequency Provider Last Rate Last Admin    sodium chloride flush 0.9 % injection 5-40 mL  5-40 mL IntraVENous PRN Yari Welch MD           Past Medical History:    Past Medical History:   Diagnosis Date    Diabetes mellitus ([facility] [address]     Hyperlipidemia     Hypertension        Surgical History:  No past surgical history on file.          Pre-Sedation:  Pre-Sedation Documentation and Exam:  I have personally completed a history, physical exam & review of systems for this patient (see notes). Prior History of Anesthesia Complications:   none    Modified Mallampati:  II (soft palate, uvula, fauces visible)    ASA Classification:  Class 2 - A normal healthy patient with mild systemic disease    Russ Scale: Activity:  2 - Able to move 4 extremities voluntarily on command  Respiration:  2 - Able to breathe deeply and cough freely  Circulation:  2 - BP+/- 20mmHg of normal  Consciousness:  2 - Fully awake  Oxygen Saturation (color):  2 - Able to maintain oxygen saturation >92% on room air    Sedation/Anesthesia Plan:  Guard the patient's safety and welfare. Minimize physical discomfort and pain. Minimize negative psychological responses to treatment by providing sedation and analgesia and maximize the potential amnesia. Patient to meet pre-procedure discharge plan.     Medication Planned:  midazolam intravenously and fentanyl intravenously    Patient is an appropriate candidate for plan of sedation:   yes      Electronically signed by Tangela Nunez MD on 2/8/2023 at 8:03 AM

## 2023-02-08 NOTE — H&P
[facility]  Cardiac Consult     Referring Provider:  On File Not (Inactive)     No chief complaint on file. History of Present Illness:  46 y.o. male seen as a new patient for afib, HTN and DOT clearance. He was found to have afib 10/2021 during a DOT evaluation. He was admitted in [facility] and underwent ALLEN cardioversion. He was placed on eliquis. He did not f/u with cardiology as it was in [facility] and has been followed by her PCP. No obvious recurrence of afib. He has chronic mild dyspnea with exertion without associated chest pain. Resolves with rest. BP has been high with CEO=[id number]. Due to this lisinopril was increased from 10=>20 mg by his PCP. He no longer has a PCP as she left the practice. He now needs DOT clearance for his job. He does snore and has never been evaluated for sleep apnea. Past Medical History:   has a past medical history of Diabetes mellitus ([address] Hyperlipidemia, and Hypertension. Surgical History:   has no past surgical history on file. Social History:  Social History     Tobacco Use    Smoking status: Never    Smokeless tobacco: Never   Substance Use Topics    Alcohol use: Yes     Comment: gelacio        Family History:  family history includes Heart Attack in his mother. Allergies:  Patient has no known allergies. Home Medications:  Prior to Visit Medications    Medication Sig Taking?  Authorizing Provider   dilTIAZem KONG [facility]) 240 MG extended release capsule Take 1 capsule by mouth every evening  Dea Roberts MD   hydroCHLOROthiazide (MICROZIDE) 12.5 MG capsule Take 1 capsule by mouth every morning  Dea Roberts MD   lisinopril (PRINIVIL;ZESTRIL) 20 MG tablet Take 1 tablet by mouth daily  Dea Roberts MD   apixaban (ELIQUIS) 5 MG TABS tablet Take 5 mg by mouth 2 times daily  Historical Provider, MD   atorvastatin (LIPITOR) 20 MG tablet Take 20 mg by mouth daily  Historical Provider, MD   metFORMIN (GLUCOPHAGE) 500 MG tablet Take 500 mg by mouth daily  Historical Provider, MD       [x] Medications and dosages reviewed. ROS:  [x]Full ROS obtained and negative except as mentioned in HPI      PHYSICAL EXAMINATION:  Vitals:    02/08/23 0729   BP: (!) 148/88   Pulse: 62   Resp: 18   Temp: 98 °F (36.7 °C)   SpO2: 97%   Weight: (!) 303 lb (137.4 kg)   Height: 5' 11\" (1.803 m)        GENERAL: Well developed, well nourished, No acute distress  NEUROLOGICAL: Alert and oriented  PSYCH: Calm affect  SKIN: Warm and dry, No visible rash,   EYES: Pupils equal and round, Sclera non-icteric,   HENT:  External ears and nose unremarkable, mucus membranes moist  MUSCULOSKELETAL: Normal cephalic, neck supple  CAROTID: Normal upstroke, no bruits  CARDIAC: JVP normal, Normal PMI, regular rate and rhythm, normal S1S2, no murmur, rub, or gallop  RESPIRATORY: Normal respiratory effort, clear to auscultation bilaterally  EXTREMITIES: No edema  GASTROINTESTINAL: normal bowel sounds, soft, non-tender, No hepatomegaly     ALLEN 10/2021 [facility]      Left Ventricle: Left ventricle cavity size is normal. Wall thickness is   normal. Systolic function is low normal with an ejection fraction of 50%. There are no regional LV wall motion abnormalities. Right Ventricle: Right ventricle cavity appears normal. Systolic   function is normal.     Left Atrium: Left atrium cavity is mildly dilated. There is no thrombus   in the left atrial appendage. There is no mass in the left atrial   appendage. Saline contrast study is negative without provocation. No significant valvular abnormalities     Successful synchronized cardioversion to sinus rhythm with a 200 J   shock.      EKG:  NSR, Normal      ASSESSMENT:    HTN:  Poorly controlled  BP (!) 168/90 (Site: Left Upper Arm, Position: Sitting, Cuff Size: Large Adult)   Pulse 87   Ht 5' 11\" (1.803 m)   Wt (!) 303 lb (137.4 kg)   SpO2 98%   BMI 42.26 kg/m²   Increase dilt to 240 mg, Add HCTZ 12.5    Dyspnea with Exertion:  Plan stress ECHO      Afib:  CHADS2-Vasc=2 (HTN, AODM)  Continue anticoagulation with eliquis  Currently in sinus    Snoring:  Suspect sleep apnea  Plan sleep evaluation    Hyperlipidemia:  Controlled  LDL=58  Continue lipitor    DOT:  Needs better BP control and stress ECHO      PLAN:  Increase dilt to 240  HCTZ 12.5  Sleep eval  Stress ECHO  F/u 3 weeks    Thank you for allowing me to participate in the care of this individual.      Brooke Cornell M.D., [facility]    I have reviewed the history and physical and examined the patient and find no relevant changes in the history and physical exam, including heart and lung sounds, except for positive stress echo and coronary CTA. Conclusions      Summary   -Normal left ventricle size, moderate concentric wall thickness and systolic   function with an estimated ejection fraction of 55%.   -No regional wall motion abnormalities are seen.   -Normal diastolic function. E/e\"=12.6.   -Aortic valve appears sclerotic but opens adequately.   -No evidence of significant valvular insufficiency. Tera GXT with echo imaging performed. Pt walked 7:58 to a MHR of 151 (89%   of pred) to 9.3 METS   He had dyspnea at peak exercise but no angina. Equivocal ST depression of less than one mm in leads i,11,111, f, V3-V6 at   peak exercise which persisted into recovery with one mm st depression in   lead II and AVF during late recovery. This was associated with a marked   hypertensive BP response to exercise from 160/ at baseline to 229/ during   recovery. No atrial fibrillation noted. Rare PVC at peak exercise. Echo images are suboptimal due to pt obesity but appear to be augmenting   normally. Equivocal stress echo due to borderline EKG changes which may be secondary   to hypertension and suboptimal images. Pt instructed to increase his   lisinopril to 40 mg daily and has f/u with Dr Poncho Li in 2 weeks.     1. Extensive atherosclerotic plaque in the proximal mid and distal LAD with long segment of moderate stenosis estimated at 50-70% in the mid LAD and severe stenosis estimated at 70-90% in the distal LAD just past the second diagonal coronary artery. 2. Codominant coronary arteries. 3. Soft plaque in the proximal and mid right coronary artery with minimal stenosis less than 25%. 4. Widely patent circumflex coronary artery and obtuse marginal.               CAD RADS 4, severe stenosis distal LAD. Recommend further evaluation with ICA. I have reviewed with the patient and/or family the risks, benefits, and alternatives to the procedure. Based on these findings I recommend left heart cath for definitive evaluation of coronary arteries. Risks, benefits, expectations, and alternative treatments were discussed. Questions appropriately answered. Tonio Casiano agrees to proceed and verbalized understanding.        Denise Winters MD 2/8/2023 8:04 AM

## 2023-02-08 NOTE — OP NOTE
Operative Note    Patient:  Yasmani Fernando   :   1970    Procedural Summary  ~Consent:   Obtained written and verbal consent      Risks/benefits explained in detail  ~Procedure:    Left Heart Catheterization  ~Medications:    Procedural sedation with minimal conscious sedation  ~Complications:   None  ~Blood Loss:    <10cc  ~Specimens:    None obtained  ~Pre-sedation re-evaluation: Performed immediately prior to procedure. Medication and Procedural Reconciliation:  An independent trained observer pushed medications at my direction. We monitored the patient's level of consciousness and vital signs/physiologic status throughout the procedure duration (see start and stop times below). Sedation: 6 mg Versed, 275 mcg Fentanyl  Sedation start: 815  Sedation stop: 908    Cardiac Cath PCI:  Anatomy:   LM-nml   LAD-prox 40%, mid 90%, apical LAD 99% small vessel  Cx-nml  OM- nml  RCA-dom mid 60%  RPDA- nml  LVEF- 60%  LVG- nml  LVEDP- 7    Intervention  ~Successful PCI to LAD with 3.0x22 MELA. PD with 3.5x15 NC. Excellent Result. Contrast: 121  Flouro Time: 4.8  Access: R radial a    Impression  ~Coronary Angiography w/ severe single vessel CAD  ~LVG with LVEF of 60 and no regional wall motion abnormalities  ~Successful complex angioplasty and stenting of LAD        Recommendation  ~Aggressive medical treatment and risk factor modification  ~1. Post cath IVF. Bedrest.   2. Recommend beta blocker, high potency statin, eliquis and plavix for 12 months. No aspirin due to high bleed risk with triple therapy. 3. Referral to outpatient cardiac rehab phase II will be deferred until patient follow-up in office and then determine patient safety and appropriateness to proceed  4. Patient has been advised on the importance of regular exercise of at least 20-30 minutes daily. 5. Patient counseled about and offered assistance for smoking cessation   6.  Follow up in 2 weeks with cardiology            Natacha Wu MD, MD 2/8/2023 9:11 AM

## 2023-02-28 ENCOUNTER — TELEPHONE (OUTPATIENT)
Dept: CARDIOLOGY CLINIC | Age: 53
End: 2023-02-28

## 2023-03-01 NOTE — TELEPHONE ENCOUNTER
Patient had cardiac stent 2/8. Increased Lipitor from 20- 80 mg daily, started Coreg, and Plavix. FU not scheduled until end of May. Tried calling patient. Asked him to call back to discuss symptoms. Patient called back. He has been having a lot of loose stools in the morning after taking Lipitor. He had been tolerating the 20 mg of Lipitor okay. Patient asking if he can exercise. Patient has a treadmill and rowing machine. He also wants to start free weights.

## 2023-03-21 ENCOUNTER — OFFICE VISIT (OUTPATIENT)
Dept: CARDIOLOGY CLINIC | Age: 53
End: 2023-03-21
Payer: COMMERCIAL

## 2023-03-21 VITALS
HEIGHT: 71 IN | SYSTOLIC BLOOD PRESSURE: 164 MMHG | OXYGEN SATURATION: 98 % | HEART RATE: 63 BPM | DIASTOLIC BLOOD PRESSURE: 84 MMHG | BODY MASS INDEX: 40.22 KG/M2 | WEIGHT: 287.3 LBS

## 2023-03-21 DIAGNOSIS — I48.0 PAROXYSMAL ATRIAL FIBRILLATION (HCC): ICD-10-CM

## 2023-03-21 DIAGNOSIS — R06.83 SNORES: ICD-10-CM

## 2023-03-21 DIAGNOSIS — I10 PRIMARY HYPERTENSION: ICD-10-CM

## 2023-03-21 DIAGNOSIS — E78.2 MIXED HYPERLIPIDEMIA: ICD-10-CM

## 2023-03-21 DIAGNOSIS — I25.10 CORONARY ARTERY DISEASE INVOLVING NATIVE CORONARY ARTERY OF NATIVE HEART WITHOUT ANGINA PECTORIS: Primary | ICD-10-CM

## 2023-03-21 PROCEDURE — 3077F SYST BP >= 140 MM HG: CPT | Performed by: NURSE PRACTITIONER

## 2023-03-21 PROCEDURE — 3079F DIAST BP 80-89 MM HG: CPT | Performed by: NURSE PRACTITIONER

## 2023-03-21 PROCEDURE — 99214 OFFICE O/P EST MOD 30 MIN: CPT | Performed by: NURSE PRACTITIONER

## 2023-03-21 RX ORDER — LISINOPRIL 40 MG/1
40 TABLET ORAL EVERY MORNING
Qty: 90 TABLET | Refills: 1 | Status: SHIPPED | OUTPATIENT
Start: 2023-03-21

## 2023-03-21 RX ORDER — LISINOPRIL 10 MG/1
10 TABLET ORAL EVERY MORNING
COMMUNITY
End: 2023-03-21

## 2023-03-21 RX ORDER — AMLODIPINE BESYLATE 5 MG/1
5 TABLET ORAL DAILY
Qty: 90 TABLET | Refills: 1 | Status: SHIPPED | OUTPATIENT
Start: 2023-03-21

## 2023-03-21 NOTE — PATIENT INSTRUCTIONS
Increase lisinopril to 40 mg once a day    Begin amlodipine / norvasc 5 mg daily to help with BP control    Referral to pulmonologist : see if you have sleep apnea    Keep appt with Dr. Poncho Li in May

## 2023-03-21 NOTE — PROGRESS NOTES
depression in   lead II and AVF during late recovery. This was associated with a marked   hypertensive BP response to exercise from 160/ at baseline to 229/ during   recovery. No atrial fibrillation noted. Rare PVC at peak exercise. Echo images are suboptimal due to pt obesity but appear to be augmenting   normally. Equivocal stress echo due to borderline EKG changes which may be secondary   to hypertension and suboptimal images. Pt instructed to increase his   lisinopril to 40 mg daily and has f/u with Dr Rosalee Madsen in 2 weeks    Echo: Summary   -Normal left ventricle size, moderate concentric wall thickness and systolic   function with an estimated ejection fraction of 55%.   -No regional wall motion abnormalities are seen.   -Normal diastolic function. E/e\"=12.6.   -Aortic valve appears sclerotic but opens adequately.   -No evidence of significant valvular insufficiency    CTA cardiac: 1/30/23:     Impression   1. Extensive atherosclerotic plaque in the proximal mid and distal LAD with long segment of moderate stenosis estimated at 50-70% in the mid LAD and severe stenosis estimated at 70-90% in the distal LAD just past the second diagonal coronary artery. 2. Codominant coronary arteries. 3. Soft plaque in the proximal and mid right coronary artery with minimal stenosis less than 25%. 4. Widely patent circumflex coronary artery and obtuse marginal.       Last Angiogram: 2/8/23  LM-nml   LAD-prox 40%, mid 90%, apical LAD 99% small vessel  Cx-nml  OM- nml  RCA-dom mid 60%  RPDA- nml  LVEF- 60%  LVG- nml  LVEDP- 7  Intervention  ~Successful PCI to LAD with 3.0x22 MELA. PD with 3.5x15 NC. Excellent Result. Impression  ~Coronary Angiography w/ severe single vessel CAD  ~LVG with LVEF of 60 and no regional wall motion abnormalities  ~Successful complex angioplasty and stenting of LAD         Assessment:      Diagnosis Orders   1.  Coronary artery disease involving native coronary artery of native heart

## 2023-06-28 ENCOUNTER — TELEPHONE (OUTPATIENT)
Dept: CARDIOLOGY CLINIC | Age: 53
End: 2023-06-28

## 2023-06-30 DIAGNOSIS — I25.83 CORONARY ARTERY DISEASE DUE TO LIPID RICH PLAQUE: Primary | ICD-10-CM

## 2023-06-30 DIAGNOSIS — I25.10 CORONARY ARTERY DISEASE DUE TO LIPID RICH PLAQUE: Primary | ICD-10-CM

## 2023-07-11 ENCOUNTER — TELEPHONE (OUTPATIENT)
Dept: CARDIOLOGY CLINIC | Age: 53
End: 2023-07-11

## 2023-07-11 ENCOUNTER — OFFICE VISIT (OUTPATIENT)
Dept: CARDIOLOGY CLINIC | Age: 53
End: 2023-07-11
Payer: COMMERCIAL

## 2023-07-11 ENCOUNTER — HOSPITAL ENCOUNTER (OUTPATIENT)
Dept: NON INVASIVE DIAGNOSTICS | Age: 53
Discharge: HOME | End: 2023-07-11
Payer: COMMERCIAL

## 2023-07-11 VITALS
HEIGHT: 71 IN | DIASTOLIC BLOOD PRESSURE: 80 MMHG | SYSTOLIC BLOOD PRESSURE: 148 MMHG | OXYGEN SATURATION: 97 % | HEART RATE: 77 BPM | WEIGHT: 293 LBS | BODY MASS INDEX: 41.02 KG/M2

## 2023-07-11 DIAGNOSIS — I48.91 ATRIAL FIBRILLATION, UNSPECIFIED TYPE (HCC): ICD-10-CM

## 2023-07-11 DIAGNOSIS — I25.10 CORONARY ARTERY DISEASE DUE TO LIPID RICH PLAQUE: ICD-10-CM

## 2023-07-11 DIAGNOSIS — E78.2 MIXED HYPERLIPIDEMIA: ICD-10-CM

## 2023-07-11 DIAGNOSIS — I10 PRIMARY HYPERTENSION: ICD-10-CM

## 2023-07-11 DIAGNOSIS — I25.83 CORONARY ARTERY DISEASE DUE TO LIPID RICH PLAQUE: ICD-10-CM

## 2023-07-11 DIAGNOSIS — R94.39 ABNORMAL STRESS ELECTROCARDIOGRAM TEST USING TREADMILL: ICD-10-CM

## 2023-07-11 DIAGNOSIS — I25.83 CORONARY ARTERY DISEASE DUE TO LIPID RICH PLAQUE: Primary | ICD-10-CM

## 2023-07-11 DIAGNOSIS — I25.10 CORONARY ARTERY DISEASE DUE TO LIPID RICH PLAQUE: Primary | ICD-10-CM

## 2023-07-11 PROCEDURE — 99214 OFFICE O/P EST MOD 30 MIN: CPT | Performed by: INTERNAL MEDICINE

## 2023-07-11 PROCEDURE — 3079F DIAST BP 80-89 MM HG: CPT | Performed by: INTERNAL MEDICINE

## 2023-07-11 PROCEDURE — 93017 CV STRESS TEST TRACING ONLY: CPT | Performed by: INTERNAL MEDICINE

## 2023-07-11 PROCEDURE — 3077F SYST BP >= 140 MM HG: CPT | Performed by: INTERNAL MEDICINE

## 2023-07-11 RX ORDER — CARVEDILOL 25 MG/1
25 TABLET ORAL 2 TIMES DAILY
Qty: 180 TABLET | Refills: 4 | Status: SHIPPED | OUTPATIENT
Start: 2023-07-11

## 2023-07-11 NOTE — PATIENT INSTRUCTIONS
Increase Coreg to 25 mg twice a day  Left heart catheterization- do NOT take Eliquis for 48 hrs prior to procedure  Follow up will be scheduled after procedure      Fax [phone]

## 2023-07-11 NOTE — TELEPHONE ENCOUNTER
Date of Procedure: Monday 7/17/23 @ [facility] with Dr. Marivel De La O     Time of arrival: 7:30 am     Procedure time: 9:00 an     Procedure: [facility]    Called and spoke to Blaine Weir and he is agreeable to date and time. Reviewed instructions and he verbalized understanding. Encouraged to call with any questions or concerns.      Published on Jaeger and e-mailed to cath lab

## 2023-07-11 NOTE — PROGRESS NOTES
Patient instructed on Tera Protocol Stress Test Procedure including possible side effects and adverse reactions. Verbalizes knowledge and understanding and denies having any questions.

## 2023-07-11 NOTE — PROGRESS NOTES
[facility]  Cardiac Consult     Referring Provider:  On File Not (Inactive)     Chief Complaint   Patient presents with    Follow-up    Hypertension     F/u stress test     Hyperlipidemia    Atrial Fibrillation        History of Present Illness:  46 y.o. male seen as a new patient for afib, HTN and DOT clearance. He was found to have afib 10/2021 during a DOT evaluation. He was admitted in [facility] and underwent ALLEN cardioversion. He was placed on eliquis. He did not f/u with cardiology as it was in [facility] and has been followed by his PCP. He was seen 2/2023 with chronic mild dyspnea with exertion without associated chest pain. BP  high with LVM=166-003. He now needed DOT clearance for his job. Stress ECHO obtained and was abnormal. Cardiac CTA also abnormal. Cardiac cath performed with stenting of LAD. Residual moderate RCA, occluded distal LAD with collaterals, moderate diffuse D1 disease. He has been doing OK. No chest pain, but still dyspnea with exertion. BP on high side. GXT today for DOT again abnormal.     Past Medical History:   has a past medical history of Diabetes mellitus ([address]), Hyperlipidemia, and Hypertension. Surgical History:   has no past surgical history on file. Social History:  Social History     Tobacco Use    Smoking status: Never    Smokeless tobacco: Never   Substance Use Topics    Alcohol use: Yes     Comment: gelacio        Family History:  family history includes Heart Attack in his mother. Allergies:  Patient has no known allergies. Home Medications:  Prior to Visit Medications    Medication Sig Taking?  Authorizing Provider   lisinopril (PRINIVIL;ZESTRIL) 40 MG tablet Take 1 tablet by mouth every morning Yes Emmanuel Cifuentes, APRN - CNP   amLODIPine (NORVASC) 5 MG tablet Take 1 tablet by mouth daily Yes Emmanuel Cifuentes, APRN - CNP   atorvastatin (LIPITOR) 80 MG tablet Take 1 tablet by mouth daily  Patient taking differently: Take 0.5 tablets by

## 2023-07-12 ENCOUNTER — TELEPHONE (OUTPATIENT)
Dept: CARDIOLOGY CLINIC | Age: 53
End: 2023-07-12

## 2023-07-12 RX ORDER — SODIUM CHLORIDE 0.9 % (FLUSH) 0.9 %
5-40 SYRINGE (ML) INJECTION PRN
OUTPATIENT
Start: 2023-07-12

## 2023-07-12 NOTE — TELEPHONE ENCOUNTER
Per Dr Luis Eduardo Balbuena- his EKG looks about the same as the previous one. 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 He will definitely not be going back to driving the Monday after the procedure. We will have to see what the procedure shows. It is possible he will need another stress test after the procedure depending on what is found and if any interventions are done at the time of the procedure.  (Such as stenting)

## 2023-07-12 NOTE — TELEPHONE ENCOUNTER
Pt is questioning if 07/17/23 EKG is better or worse than last one. 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  His boss would like him to return to work Monday after the test, but pt feels he needs to take short term disability in order to make sure he is ready to drive. Please call to discuss.

## 2023-07-14 NOTE — TELEPHONE ENCOUNTER
Hellen Clark called me this afternoon wanting to reschedule his procedure. He states that he wanted to talk it over first with his PCP. New date:    Date of Procedure: Wednesday 8/2/23 @ [address] with Dr. Evan Lebron      Time of arrival: 8:30 am     Procedure time: 10:00 am     Procedure: Manuel Adair is agreeable to date and time. Reviewed instructions and he verbalized understanding. Encouraged to call with any questions or concerns. Updated Jay Hertford and e-mailed to cath lab.

## 2023-07-25 ENCOUNTER — TELEPHONE (OUTPATIENT)
Dept: CARDIOLOGY CLINIC | Age: 53
End: 2023-07-25

## 2023-07-25 RX ORDER — CARVEDILOL 12.5 MG/1
TABLET ORAL
Qty: 180 TABLET | Refills: 1 | OUTPATIENT
Start: 2023-07-25

## 2023-07-25 RX ORDER — AMLODIPINE BESYLATE 5 MG/1
TABLET ORAL
Qty: 90 TABLET | Refills: 4 | Status: SHIPPED | OUTPATIENT
Start: 2023-07-25

## 2023-07-25 NOTE — TELEPHONE ENCOUNTER
Disp Refills Start End    carvedilol (COREG) 25 MG tablet 180 tablet 4 7/11/2023     Sig - Route:  Take 1 tablet by mouth 2 times daily - Oral    Sent to pharmacy as: Carvedilol 25 MG Oral Tablet (COREG)    Cosign for Ordering: Accepted by Brayan Gonzalez MD on 7/11/2023 12:59 PM    E-Prescribing Status: Receipt confirmed by pharmacy (7/11/2023 12:25 PM EDT)

## 2023-07-25 NOTE — TELEPHONE ENCOUNTER
CARDIAC CLEARANCE     What type of procedure are you having? Colonoscopy   Which physician is performing your procedure? Dr Hartmann Class     When is your procedure scheduled for?   8/23   Where are you having this procedure? [facility]  Are you taking Blood Thinners? Plavix eliquis   If so what? (Name/dose/frequesncy)     Does the surgeon want you to stop your blood thinner? If so for how long?  5 days prior for plavix/  2 days prior for eliquis    Phone Number and Contact Name for Physicians office: [phone]    Fax number to send information: [phone]

## 2023-07-31 ENCOUNTER — TELEPHONE (OUTPATIENT)
Dept: CARDIOLOGY CLINIC | Age: 53
End: 2023-07-31

## 2023-07-31 NOTE — TELEPHONE ENCOUNTER
Pt is needing to cancel 8/2/23 cath, Pt would like to reschedule.  Please call to give new date and time

## 2023-08-01 NOTE — TELEPHONE ENCOUNTER
Called and spoke to Blaine Weir and he stated he wanted to cancel his procedure for tomorrow 8/2/23 and reschedule it the first part of September. He said he is going to get a second opinion and then wants to have his colonoscopy procedure done first.      Date of Procedure: Tuesday 9/5/23 @ [facility] with Dr. Lady Meier     Time of arrival: 6:45 am     Procedure time: 8:00 am     Spoke to Blaine Weir and he is agreeable to date and time. Encouraged to call with any questions or concerns. Updated Qgenda and emailed to cath lab.

## 2023-08-18 RX ORDER — CARVEDILOL 12.5 MG/1
TABLET ORAL
Qty: 180 TABLET | Refills: 1 | OUTPATIENT
Start: 2023-08-18

## 2023-08-18 NOTE — TELEPHONE ENCOUNTER
carvedilol (COREG) 25 MG tablet 180 tablet 4 7/11/2023     Sig - Route:  Take 1 tablet by mouth 2 times daily - Oral    Sent to pharmacy as: Carvedilol 25 MG Oral Tablet (COREG)    Cosign for Ordering: Accepted by Je Abbott MD on 7/11/2023 12:59 PM    E-Prescribing Status: Receipt confirmed by pharmacy (7/11/2023 12:25 PM EDT)

## 2023-09-05 ENCOUNTER — HOSPITAL ENCOUNTER (OUTPATIENT)
Dept: CARDIAC CATH/INVASIVE PROCEDURES | Age: 53
Discharge: HOME | End: 2023-09-05

## 2023-09-05 ENCOUNTER — TELEPHONE (OUTPATIENT)
Dept: CARDIOLOGY CLINIC | Age: 53
End: 2023-09-05

## 2023-09-05 NOTE — TELEPHONE ENCOUNTER
9/4 Pt called left message [facility] voicemail they would like to cancel their appt scheduled for Monday 9/5 at 7 am.    Pls advise thank you

## 2023-09-05 NOTE — TELEPHONE ENCOUNTER
I called and left a message for Jessica Barney asking him to return my call if he wanted to reschedule his procedure. I canceled it on Sancho Awad and emailed the cath lab.

## 2023-12-18 NOTE — TELEPHONE ENCOUNTER
Left message asking patient to call office back. He was going to [facility] for a second opinion. If he is not planning to return to office with Dr Alamo than the other cardiologist will have to take over refill.

## 2023-12-19 RX ORDER — CLOPIDOGREL BISULFATE 75 MG/1
75 TABLET ORAL DAILY
Qty: 90 TABLET | Refills: 0 | OUTPATIENT
Start: 2023-12-19

## 2024-01-08 NOTE — TELEPHONE ENCOUNTER
Last OV: 10/5/23 dr santo [facility]  Last Labs: 9/25/23 lipid,ast,alt care everywhere  Next appt: none

## 2024-01-10 RX ORDER — ATORVASTATIN CALCIUM 80 MG/1
80 TABLET, FILM COATED ORAL DAILY
Qty: 90 TABLET | Refills: 3 | OUTPATIENT
Start: 2024-01-10

## 2024-01-22 RX ORDER — CARVEDILOL 12.5 MG/1
12.5 TABLET ORAL 2 TIMES DAILY
Qty: 180 TABLET | Refills: 1 | OUTPATIENT
Start: 2024-01-22

## 2024-01-22 NOTE — TELEPHONE ENCOUNTER
carvedilol (COREG) 25 MG tablet 180 tablet 4 7/11/2023 --    Sig - Route: Take 1 tablet by mouth 2 times daily - Oral    Sent to pharmacy as: Carvedilol 25 MG Oral Tablet (COREG)    Cosign for Ordering: Accepted by Zenon Alamo MD on 7/11/2023 12:59 PM    E-Prescribing Status: Receipt confirmed by pharmacy (7/11/2023 12:25 PM EDT)    Pharmacy    [facility]/pharmacy #[id number] - [place], [place] - [address] - P [phone] - F [phone] [address] [address]  Phone: [phone]  Fax: [phone]     Per last ov notes from 7/11/23,  Instructions    Increase Coreg to 25 mg twice a day  Left heart catheterization- do NOT take Eliquis for 48 hrs prior to procedure  Follow up will be scheduled after procedure        Last OV: 7/11/23 mmk,   Last Labs: 2/8/23 ekg  Next appt: none

## 2024-01-25 RX ORDER — CLOPIDOGREL BISULFATE 75 MG/1
75 TABLET ORAL DAILY
Qty: 90 TABLET | Refills: 3 | Status: SHIPPED | OUTPATIENT
Start: 2024-01-25

## 2024-02-13 NOTE — TELEPHONE ENCOUNTER
Last ov:23 PSC  Next ov:  Last EK23  Last labs:23  Last filled:   Disp Refills Start End    atorvastatin (LIPITOR) 80 MG tablet 90 tablet 3 2023 --    Sig - Route: Take 1 tablet by mouth daily - Oral    Patient taking differently: Take 0.5 tablets by mouth daily    Sent to pharmacy as: Atorvastatin Calcium 80 MG Oral Tablet (LIPITOR)    E-Prescribing Status: Receipt confirmed by pharmacy (2023  9:16 AM EST)

## 2024-02-15 RX ORDER — ATORVASTATIN CALCIUM 80 MG/1
80 TABLET, FILM COATED ORAL DAILY
Qty: 90 TABLET | Refills: 3 | OUTPATIENT
Start: 2024-02-15

## 2024-04-08 RX ORDER — CARVEDILOL 25 MG/1
25 TABLET ORAL 2 TIMES DAILY
Qty: 180 TABLET | Refills: 10 | OUTPATIENT
Start: 2024-04-08